# Patient Record
Sex: FEMALE | Race: BLACK OR AFRICAN AMERICAN | Employment: OTHER | URBAN - METROPOLITAN AREA
[De-identification: names, ages, dates, MRNs, and addresses within clinical notes are randomized per-mention and may not be internally consistent; named-entity substitution may affect disease eponyms.]

---

## 2017-05-09 ENCOUNTER — TELEPHONE (OUTPATIENT)
Dept: FAMILY MEDICINE CLINIC | Age: 57
End: 2017-05-09

## 2017-05-09 NOTE — TELEPHONE ENCOUNTER
Called pt, and left a voice message, that her letter has been completed and I will be mailing a copy to her home and will leave a copy at the , if she wants to pick it up.

## 2017-05-09 NOTE — LETTER
5/9/2017 2:02 PM 
 
Ms. Easton Mendosa [address] [address] To whom it may Concern:  
Provider: Baldev Valle Case: Jamari Spear. Navya Valle is a patient of [employer], and Dr. Rangel Palacios is her primary care 
 
provider. I  have managed care for Ms. Mendosa and Ree, for over 5 years, in which time I have 
 
observed the love, compassion, and dedication Ms. Mendosa has for Ree. By insuring she arrives at 
 
appointments on time, following medical advice, and calling the office with any concerns she may have 
 
in regard to Michael gusman, and following through with the recommendations. Michael gusman always comes into the 
 
office smiling, well nourished, and with a great appearance. With all of the above, I do feel it is in the 
 
best interest of Ree to remain under the care of Ms. Mendosa where she is already receiving great care. If there are any questions or concerns please have Ms. Mendosa contact my office at [phone].   
 
 
 
 
Sincerely, 
 
 
Alan Martinez MD

## 2018-01-27 ENCOUNTER — OFFICE VISIT (OUTPATIENT)
Dept: FAMILY MEDICINE CLINIC | Age: 58
End: 2018-01-27

## 2018-01-27 VITALS
DIASTOLIC BLOOD PRESSURE: 84 MMHG | HEART RATE: 65 BPM | WEIGHT: 215 LBS | SYSTOLIC BLOOD PRESSURE: 129 MMHG | OXYGEN SATURATION: 95 % | BODY MASS INDEX: 38.09 KG/M2 | TEMPERATURE: 98.7 F | HEIGHT: 63 IN | RESPIRATION RATE: 18 BRPM

## 2018-01-27 DIAGNOSIS — F17.200 SMOKER: ICD-10-CM

## 2018-01-27 DIAGNOSIS — J02.9 SORE THROAT: Primary | ICD-10-CM

## 2018-01-27 DIAGNOSIS — J06.9 UPPER RESPIRATORY TRACT INFECTION, UNSPECIFIED TYPE: ICD-10-CM

## 2018-01-27 LAB
FLUAV+FLUBV AG NOSE QL IA.RAPID: NEGATIVE POS/NEG
FLUAV+FLUBV AG NOSE QL IA.RAPID: NEGATIVE POS/NEG
S PYO AG THROAT QL: POSITIVE
VALID INTERNAL CONTROL?: YES
VALID INTERNAL CONTROL?: YES

## 2018-01-27 RX ORDER — AZITHROMYCIN 250 MG/1
TABLET, FILM COATED ORAL
Qty: 6 TAB | Refills: 0 | Status: SHIPPED | OUTPATIENT
Start: 2018-01-27 | End: 2018-02-01

## 2018-01-27 NOTE — MR AVS SNAPSHOT
[address] [address] 
[phone] Patient: Jackie Jimenez MRN: [id number] Visit Information Date & Time Provider Department Dept. Phone Encounter #  
 1/27/2018  2:30 PM Estevan Coto MD [facility] [phone] [id number] Upcoming Health Maintenance Date Due Hepatitis C Screening 1960 Pneumococcal 19-64 Medium Risk (1 of 1 - PPSV23) 5/4/1980 DTaP/Tdap/Td series (1 - Tdap) 5/4/1982 FOBT Q 1 YEAR AGE 50-75 3/20/2013 BREAST CANCER SCRN MAMMOGRAM 12/29/2016 PAP AKA CERVICAL CYTOLOGY 3/20/2017 Influenza Age 5 to Adult 8/1/2017 Allergies as of 1/27/2018  Review Complete On: 1/27/2018 By: Estevan Coto MD  
  
 Severity Noted Reaction Type Reactions Bactrim [Sulfamethoprim Ds]  03/20/2012    Hives Current Immunizations  Never Reviewed No immunizations on file. Not reviewed this visit You Were Diagnosed With   
  
 Codes Comments Sore throat    -  Primary ICD-10-CM: J02.9 ICD-9-CM: 334 Upper respiratory tract infection, unspecified type     ICD-10-CM: J06.9 ICD-9-CM: 465.9 BMI 38.0-38.9,adult     ICD-10-CM: K45.21 
ICD-9-CM: V85.38 Smoker     ICD-10-CM: U58.848 ICD-9-CM: 305.1 letter Vitals BP Pulse Temp Resp Height(growth percentile) Weight(growth percentile) 129/84 (BP 1 Location: Left arm, BP Patient Position: Sitting) 65 98.7 °F (37.1 °C) (Oral) 18 5' 3\" (1.6 m) 215 lb (97.5 kg) SpO2 BMI OB Status Smoking Status 95% 38.09 kg/m2 Hysterectomy Current Every Day Smoker Vitals History BMI and BSA Data Body Mass Index Body Surface Area 38.09 kg/m 2 2.08 m 2 Preferred Pharmacy Pharmacy Name Phone [address] [phone] Your Updated Medication List  
  
   
 This list is accurate as of: 1/27/18  2:50 PM.  Always use your most recent med list.  
  
  
  
  
 atenolol-chlorthalidone 100-25 mg per tablet Commonly known as:  TENORETIC  
TAKE 1 TABLET BY MOUTH DAILY  
  
 azithromycin 250 mg tablet Commonly known as:  Gresham Bon Take 2 tablets today, then take 1 tablet daily  
  
 cimetidine 800 mg tablet Commonly known as:  TAGAMET TAKE 1 TABLET BY MOUTH TWICE DAILY OR AS NEEDED Prescriptions Sent to Pharmacy Refills  
 azithromycin (ZITHROMAX) 250 mg tablet 0 Sig: Take 2 tablets today, then take 1 tablet daily Class: Normal  
 Pharmacy: [place] [address] [facility] [facility] Ph #: [phone] We Performed the Following AMB POC RAPID STREP A [02036 CPT(R)] AMB POC CARISSA INFLUENZA A/B TEST [36986 CPT(R)] Patient Instructions Keep up with your female wellness exams Avoid tobacco products Gargle with warm salt water Take:   over the counter tylenol as directed for pain or fever Use OTC Mucinex for cough Consider over the counter nasacort steroid nasal spray Consider trying the \"NetiPot\" which is a salt water nasal flush if you have nasal congestion:  It really helps! Make sure you use distilled water to make the saline solution Take zithromax with food If worse go to [facility]! Reba Fuchs introduces Fwd: Power patient portal. Now you can access parts of your medical record, email your doctor's office, and request medication refills online. 1. In your internet browser, go to [email or web address] 2. Click on the First Time User? Click Here link in the Sign In box. You will see the New Member Sign Up page. 3. Enter your Fwd: Power Access Code exactly as it appears below. You will not need to use this code after youve completed the sign-up process.  If you do not sign up before the expiration date, you must request a new code. · [employer] Access Code: [email or web address] Expires: 4/27/2018  2:50 PM 
 
4. Enter the last four digits of your Social Security Number (xxxx) and Date of Birth (mm/dd/yyyy) as indicated and click Submit. You will be taken to the next sign-up page. 5. Create a [employer] ID. This will be your [employer] login ID and cannot be changed, so think of one that is secure and easy to remember. 6. Create a [employer] password. You can change your password at any time. 7. Enter your Password Reset Question and Answer. This can be used at a later time if you forget your password. 8. Enter your e-mail address. You will receive e-mail notification when new information is available in [address] 9. Click Sign Up. You can now view and download portions of your medical record. 10. Click the Download Summary menu link to download a portable copy of your medical information. If you have questions, please visit the Frequently Asked Questions section of the [employer] website. Remember, [employer] is NOT to be used for urgent needs. For medical emergencies, dial 911. Now available from your iPhone and Android! Please provide this summary of care documentation to your next provider. Your primary care clinician is listed as Gabriela Cox. If you have any questions after today's visit, please call [phone].

## 2018-01-27 NOTE — PROGRESS NOTES
Yen Dailey is a 65 y.o. 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    Essential hypertension with goal blood pressure less than 140/90 I10    GERD (gastroesophageal reflux disease) K21.9    LVH (left ventricular hypertrophy) I51.7    Grave's disease     Tobacco abuse Z72.0    Gallstones K80.20    Fatty liver K76.0    Fatty hernia of linea alba K43.9    Hypercholesterolemia E78.00    Hypokalemia E87.6       Medications:  Current Outpatient Prescriptions   Medication Sig Dispense Refill    azithromycin (ZITHROMAX) 250 mg tablet Take 2 tablets today, then take 1 tablet daily 6 Tab 0    atenolol-chlorthalidone (TENORETIC) 100-25 mg per tablet TAKE 1 TABLET BY MOUTH DAILY 90 Tab 3    cimetidine (TAGAMET) 800 mg tablet TAKE 1 TABLET BY MOUTH TWICE DAILY OR AS NEEDED 60 Tab 11       Allergies: Allergies   Allergen Reactions    Bactrim [Sulfamethoprim Ds] Hives       LMP:  No LMP recorded. Patient has had a hysterectomy. Social History     Social History    Marital status:      Spouse name: N/A    Number of children: N/A    Years of education: N/A     Occupational History    Not on file.      Social History Main Topics    Smoking status: Current Every Day Smoker     Packs/day: 0.25     Years: 20.00    Smokeless tobacco: Never Used    Alcohol use Yes      Comment: Rarely    Drug use: No    Sexual activity: Yes     Partners: Male     Other Topics Concern    Not on file     Social History Narrative         Family History   Problem Relation Age of Onset    Hypertension Mother     Cancer Father     Cancer Maternal Grandfather          Meaningful use:  done      ROS:  Headaches:  no  Chest Pain:  no  SOB:  no  Fevers:  no  Other significant ROS:  She needs wellness exam    No LMP recorded. Patient has had a hysterectomy. Physical Exam  Visit Vitals    /84 (BP 1 Location: Left arm, BP Patient Position: Sitting)    Pulse 65    Temp 98.7 °F (37.1 °C) (Oral)    Resp 18    Ht 5' 3\" (1.6 m)    Wt 215 lb (97.5 kg)    SpO2 95%    BMI 38.09 kg/m2     BP Readings from Last 3 Encounters:   01/27/18 129/84   09/07/16 107/71   08/25/16 111/68     Constitutional:  Appears well,  No Acute Distress, Vitals noted  Psychiatric:   Affect normal, Alert and cooperative, Oriented to person/place/time    Eyes:   Pupils equally round and reactive, EOMI, conjunctiva clear, eyelids normal  ENT:   External ears and nose normal/lips, teeth=OK/gums normal, TMs and Orophyarynx normal  Neck:   general inspection and Thyroid normal.  No abnormal cervical or supraclavicular nodes    Lungs:   clear to auscultation, good respiratory effort  Heart: Ausculation normal.  Regular rhythm. No cardiac murmurs. 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 Sore throat J02.9 462 AMB POC RAPID STREP A      AMB POC CARISSA INFLUENZA A/B TEST      azithromycin (ZITHROMAX) 250 mg tablet   2.  Upper respiratory tract infection, unspecified type J06.9 465.9 azithromycin (ZITHROMAX) 250 mg tablet   3. BMI 38.0-38.9,adult Z68.38 V85.38    4. Smoker F17.200 305.1     letter       Plan:  Orders Placed This Encounter    AMB POC RAPID STREP A    AMB POC CARISSA INFLUENZA A/B TEST    azithromycin (ZITHROMAX) 250 mg tablet     Sig: Take 2 tablets today, then take 1 tablet daily     Dispense:  6 Tab     Refill:  0       See patient instructions  Patient Instructions   Keep up with your female wellness exams    Avoid tobacco products    Gargle with warm salt water    Take:   over the counter tylenol as directed for pain or fever    Use OTC Mucinex for cough    Consider over the counter nasacort steroid nasal spray    Consider trying the \"NetiPot\" which is a salt water nasal flush if you have nasal congestion:  It really helps!   Make sure you use distilled water to make the saline solution      Take zithromax with food    If worse go to ER        refresh note:  done    AVS Printed:  done

## 2018-01-27 NOTE — PROGRESS NOTES
1. Have you been to the ER, urgent care clinic since your last visit? Hospitalized since your last visit? No    2. Have you seen or consulted any other health care providers outside of the [facility] since your last visit? Include any pap smears or colon screening.  No  Reviewed record in preparation for visit and have necessary documentation  Pt did not bring medication to office visit for review  opportunity was given for questions  Goals that were addressed and/or need to be completed during or after this appointment include   Health Maintenance Due   Topic Date Due    Hepatitis C Screening  1960    Pneumococcal 19-64 Medium Risk (1 of 1 - PPSV23) 05/04/1980    DTaP/Tdap/Td series (1 - Tdap) 05/04/1982    FOBT Q 1 YEAR AGE 50-75  03/20/2013    BREAST CANCER SCRN MAMMOGRAM  12/29/2016    PAP AKA CERVICAL CYTOLOGY  03/20/2017    Influenza Age 9 to Adult  08/01/2017

## 2018-01-27 NOTE — PATIENT INSTRUCTIONS
Keep up with your female wellness exams    Avoid tobacco products    Gargle with warm salt water    Take:   over the counter tylenol as directed for pain or fever    Use OTC Mucinex for cough    Consider over the counter nasacort steroid nasal spray    Consider trying the \"NetiPot\" which is a salt water nasal flush if you have nasal congestion:  It really helps!   Make sure you use distilled water to make the saline solution      Take zithromax with food    If worse go to ER

## 2018-01-27 NOTE — LETTER
1/27/2018 2:48 PM 
 
Ms. Arcelia [facility] [address] [address] Body mass index is 38.09 kg/(m^2). Focus on regular exercise (150 minutes each week) and healthy eating. Eat more fruits and vegetables. Eat more protein (egg whites, beans, and nuts you know you tolerate) and less carbohydrates (white bread, white rice, white pasta, white potatoes, sodas, and sweets). Eat appropriately small portion sizes. I strongly suggest that you avoid use of any tobacco products. This may be the most important thing you can do for your health. While medicines may help, the most important thing for you is the decision to quit. If you need a \"Quit \", please call 9-307-QUIT NOW ([phone]). If you need help with nicotine withdrawal, I suggest over-the-counter nicotine replacement aids such as nicotine gum or patches, used as directed. As you may know, use of tobacco products increases your risk for many forms of cancer, heart disease, stroke, and blood clotting. Your body is very forgiving. Quit now and improve your health! Sincerely, Tom Garcia MD

## 2018-02-03 RX ORDER — ATENOLOL AND CHLORTHALIDONE TABLET 100; 25 MG/1; MG/1
TABLET ORAL
Qty: 90 TAB | Refills: 3 | Status: SHIPPED | OUTPATIENT
Start: 2018-02-03 | End: 2019-03-16 | Stop reason: SDUPTHER

## 2018-03-18 RX ORDER — CIMETIDINE 800 MG
TABLET ORAL
Qty: 60 TAB | Refills: 11 | Status: SHIPPED | OUTPATIENT
Start: 2018-03-18 | End: 2019-09-19 | Stop reason: SDUPTHER

## 2018-06-19 ENCOUNTER — HOSPITAL ENCOUNTER (OUTPATIENT)
Dept: GENERAL RADIOLOGY | Age: 58
Discharge: HOME | End: 2018-06-19
Payer: COMMERCIAL

## 2018-06-19 ENCOUNTER — OFFICE VISIT (OUTPATIENT)
Dept: FAMILY MEDICINE CLINIC | Age: 58
End: 2018-06-19

## 2018-06-19 VITALS
RESPIRATION RATE: 18 BRPM | DIASTOLIC BLOOD PRESSURE: 71 MMHG | TEMPERATURE: 98.3 F | SYSTOLIC BLOOD PRESSURE: 133 MMHG | WEIGHT: 214 LBS | HEIGHT: 63 IN | HEART RATE: 64 BPM | BODY MASS INDEX: 37.92 KG/M2

## 2018-06-19 DIAGNOSIS — M25.561 ACUTE PAIN OF RIGHT KNEE: ICD-10-CM

## 2018-06-19 DIAGNOSIS — Z12.39 BREAST CANCER SCREENING: ICD-10-CM

## 2018-06-19 DIAGNOSIS — Z12.11 ENCOUNTER FOR FIT (FECAL IMMUNOCHEMICAL TEST) SCREENING: ICD-10-CM

## 2018-06-19 DIAGNOSIS — M25.561 ACUTE PAIN OF RIGHT KNEE: Primary | ICD-10-CM

## 2018-06-19 PROCEDURE — 73562 X-RAY EXAM OF KNEE 3: CPT

## 2018-06-19 RX ORDER — IBUPROFEN 800 MG/1
800 TABLET ORAL
Qty: 30 TAB | Refills: 1 | Status: SHIPPED | OUTPATIENT
Start: 2018-06-19 | End: 2019-07-06 | Stop reason: ALTCHOICE

## 2018-06-19 NOTE — PROGRESS NOTES
Chief Complaint   Patient presents with    Knee Swelling     rt; painful; hot to touch x 3 days     1. Have you been to the ER, urgent care clinic since your last visit? No  Hospitalized since your last visit? No     2. Have you seen or consulted any other health care providers outside of the [facility] since your last visit? Include any pap smears or colon screening. No     Pt declines due vaccinations.

## 2018-06-19 NOTE — PATIENT INSTRUCTIONS

## 2018-06-19 NOTE — MR AVS SNAPSHOT
[address] [phone] Patient: Wally Nesbitt MRN: [id number] Visit Information Date & Time Provider Department Dept. Phone Encounter #  
 6/19/2018  3:45 PM Earnestine Vaz 34 [id number] Upcoming Health Maintenance Date Due Hepatitis C Screening 1960 Pneumococcal 19-64 Medium Risk (1 of 1 - PPSV23) 5/4/1980 DTaP/Tdap/Td series (1 - Tdap) 5/4/1982 FOBT Q 1 YEAR AGE 50-75 3/20/2013 BREAST CANCER SCRN MAMMOGRAM 12/29/2016 PAP AKA CERVICAL CYTOLOGY 3/20/2017 Influenza Age 5 to Adult 8/1/2018 Allergies as of 6/19/2018  Review Complete On: 6/19/2018 By: Martha Donis MD  
  
 Severity Noted Reaction Type Reactions Bactrim [Sulfamethoprim Ds]  03/20/2012    Hives Current Immunizations  Never Reviewed No immunizations on file. Not reviewed this visit You Were Diagnosed With   
  
 Codes Comments Acute pain of right knee    -  Primary ICD-10-CM: M25.561 ICD-9-CM: 719.46 Encounter for FIT (fecal immunochemical test) screening     ICD-10-CM: Z12.11 ICD-9-CM: V76.51 Breast cancer screening     ICD-10-CM: Z12.31 
ICD-9-CM: V76.10 Vitals BP Pulse Temp Resp Height(growth percentile) Weight(growth percentile) 133/71 64 98.3 °F (36.8 °C) (Oral) 18 5' 3\" (1.6 m) 214 lb (97.1 kg) BMI OB Status Smoking Status 37.91 kg/m2 Hysterectomy Current Every Day Smoker Vitals History BMI and BSA Data Body Mass Index Body Surface Area  
 37.91 kg/m 2 2.08 m 2 Preferred Pharmacy Pharmacy Name Phone [address] [phone] Your Updated Medication List  
  
   
This list is accurate as of 6/19/18  4:36 PM.  Always use your most recent med list.  
  
  
  
 atenolol-chlorthalidone 100-25 mg per tablet Commonly known as:  TENORETIC  
TAKE 1 TABLET BY MOUTH DAILY  
  
 cimetidine 800 mg tablet Commonly known as:  TAGAMET TAKE 1 TABLET BY MOUTH TWICE DAILY AS NEEDED We Performed the Following OCCULT BLOOD, IMMUNOASSAY (FIT) [id number] CPT(R)] To-Do List   
 06/19/2018 Imaging:  HUNTER MAMMO BI SCREENING INCL CAD   
  
 06/19/2018 Imaging:  XR KNEE RT MIN 4 V Introducing [facility]! [employer] introduces [employer] patient portal. Now you can access parts of your medical record, email your doctor's office, and request medication refills online. 1. In your internet browser, go to [email or web address]. [email or web address] 2. Click on the First Time User? Click Here link in the Sign In box. You will see the New Member Sign Up page. 3. Enter your [employer] Access Code exactly as it appears below. You will not need to use this code after youve completed the sign-up process. If you do not sign up before the expiration date, you must request a new code. · [employer] Access Code: [email or web address] Expires: 9/17/2018  4:36 PM 
 
4. Enter the last four digits of your Social Security Number (xxxx) and Date of Birth (mm/dd/yyyy) as indicated and click Submit. You will be taken to the next sign-up page. 5. Create a [employer] ID. This will be your [employer] login ID and cannot be changed, so think of one that is secure and easy to remember. 6. Create a [employer] password. You can change your password at any time. 7. Enter your Password Reset Question and Answer. This can be used at a later time if you forget your password. 8. Enter your e-mail address. You will receive e-mail notification when new information is available in [address] 9. Click Sign Up. You can now view and download portions of your medical record. 10. Click the Download Summary menu link to download a portable copy of your medical information. If you have questions, please visit the Frequently Asked Questions section of the [employer] website. Remember, [email or web address] is NOT to be used for urgent needs. For medical emergencies, dial 911. Now available from your iPhone and Android! Please provide this summary of care documentation to your next provider. Your primary care clinician is listed as Gonzalez Sheikh. If you have any questions after today's visit, please call [phone].

## 2018-06-19 NOTE — PROGRESS NOTES
HISTORY OF PRESENT ILLNESS  Marlene Rowe is a 63 y.o. female. HPI Comments: Marlene Rowe presents with right knee pain, redness, swelling and warmth for the past 3 days. It started after sleeping on a sofa and it felt \"off\" and has gotten worse. No locking or giving way. Denies history of trauma. She has tried 400 mg of Motrin and ice, which helps temporarily. It is worse at the end of the day. She had some sort of procedure done on that knee when she was a child. Review of Systems   Musculoskeletal: Positive for joint pain. Right knee swelling   Skin:        Right knee redness and warmth       Visit Vitals    /71    Pulse 64    Temp 98.3 °F (36.8 °C) (Oral)    Resp 18    Ht 5' 3\" (1.6 m)    Wt 214 lb (97.1 kg)    BMI 37.91 kg/m2     Physical Exam   Constitutional: She is oriented to person, place, and time. She appears well-developed and well-nourished. No distress. Musculoskeletal:        Right knee: She exhibits decreased range of motion, swelling, effusion, erythema and bony tenderness. She exhibits no deformity, no LCL laxity and no MCL laxity. No tenderness found. Legs:  Unable to evaluate meniscal signs due to pain. Neurological: She is alert and oriented to person, place, and time. Skin: She is not diaphoretic. ASSESSMENT and PLAN    ICD-10-CM ICD-9-CM    1. Acute pain of right knee M25.561 719.46 ibuprofen (MOTRIN) 800 mg tablet      CANCELED: XR KNEE RT MIN 4 V   2. Encounter for FIT (fecal immunochemical test) screening Z12.11 V76.51 OCCULT BLOOD, IMMUNOASSAY (FIT)   3. Breast cancer screening Z12.31 V76.10 HUNTER MAMMO BI SCREENING INCL CAD        Likely knee osteoarthritis  Rest, elevate, ice  Motrin dose increased for prn use  Knee x-ray  Weight loss  Regular low impact aerobics      Follow-up Disposition:  Return if symptoms worsen or fail to improve. Reviewed plan of care. Patient has provided input and agrees with goals.

## 2018-06-20 ENCOUNTER — TELEPHONE (OUTPATIENT)
Dept: FAMILY MEDICINE CLINIC | Age: 58
End: 2018-06-20

## 2018-06-27 ENCOUNTER — HOSPITAL ENCOUNTER (OUTPATIENT)
Dept: MAMMOGRAPHY | Age: 58
Discharge: HOME | End: 2018-06-27
Attending: FAMILY MEDICINE
Payer: COMMERCIAL

## 2018-06-27 DIAGNOSIS — Z12.39 BREAST CANCER SCREENING: ICD-10-CM

## 2018-06-27 PROCEDURE — 77067 SCR MAMMO BI INCL CAD: CPT

## 2018-10-15 ENCOUNTER — OFFICE VISIT (OUTPATIENT)
Dept: FAMILY MEDICINE CLINIC | Age: 58
End: 2018-10-15

## 2018-10-15 VITALS
WEIGHT: 216 LBS | RESPIRATION RATE: 18 BRPM | HEART RATE: 60 BPM | TEMPERATURE: 98.4 F | SYSTOLIC BLOOD PRESSURE: 137 MMHG | BODY MASS INDEX: 38.27 KG/M2 | HEIGHT: 63 IN | DIASTOLIC BLOOD PRESSURE: 81 MMHG

## 2018-10-15 DIAGNOSIS — J06.9 VIRAL UPPER RESPIRATORY TRACT INFECTION: Primary | ICD-10-CM

## 2018-10-15 DIAGNOSIS — Z12.11 ENCOUNTER FOR FIT (FECAL IMMUNOCHEMICAL TEST) SCREENING: ICD-10-CM

## 2018-10-15 RX ORDER — LORATADINE 10 MG/1
10 TABLET ORAL DAILY
COMMUNITY
End: 2019-07-06

## 2018-10-15 NOTE — PROGRESS NOTES
HISTORY OF PRESENT ILLNESS  Robert Benitez is a 63 y.o. female. HPI Comments: Robert Benitez is here with URI symptoms for the past 2 weeks. In particular, she is having left ear pain, a dry throat and a lot of drainage. She was exposed to strep 2 weeks ago, so she is concerned. Her symptoms are a little better. She has tried Mucinex Day and Night with a little relief. Things are worse in the afternoon. No history of allergies. Review of Systems   Constitutional: Negative for chills, fever and malaise/fatigue. HENT: Positive for congestion and ear pain. Negative for sore throat. Mucous is clear in color. There is no sinus pain. Eyes: Negative for discharge and redness. Respiratory: Positive for sputum production. Negative for cough, shortness of breath and wheezing. Scant yellow sputum   Cardiovascular: Negative for chest pain. Neurological: Negative for headaches. Visit Vitals    /81    Pulse 60    Temp 98.4 °F (36.9 °C) (Oral)    Resp 18    Ht 5' 3\" (1.6 m)    Wt 216 lb (98 kg)    BMI 38.26 kg/m2     Physical Exam   Constitutional: She is oriented to person, place, and time. She appears well-developed and well-nourished. No distress. HENT:   Head: Normocephalic. Right Ear: Tympanic membrane, external ear and ear canal normal.   Left Ear: Tympanic membrane, external ear and ear canal normal.   Nose: Nasal discharge present. Right sinus exhibits no maxillary sinus tenderness and no frontal sinus tenderness. Left sinus exhibits no maxillary sinus tenderness and no frontal sinus tenderness. Mouth/Throat: Uvula is midline, oropharynx is clear and moist and mucous membranes are normal.   Eyes: Right eye exhibits no discharge. Left eye exhibits no discharge. Right conjunctiva is not injected. Left conjunctiva is not injected. Cardiovascular: Normal rate, regular rhythm and normal heart sounds. Exam reveals no gallop and no friction rub.     No murmur heard.  Pulmonary/Chest: Effort normal and breath sounds normal. No respiratory distress. She has no wheezes. She has no rales. Lymphadenopathy:     She has no cervical adenopathy. Neurological: She is alert and oriented to person, place, and time. Skin: Skin is warm and dry. She is not diaphoretic. Nursing note and vitals reviewed. ASSESSMENT and PLAN    ICD-10-CM ICD-9-CM    1. Viral upper respiratory tract infection J06.9 465.9 loratadine (CLARITIN) 10 mg tablet   2. Encounter for FIT (fecal immunochemical test) screening Z12.11 V76.51 OCCULT BLOOD IMMUNOASSAY,DIAGNOSTIC        URI  Rest, push fluids  Add Claritin prn  Reassured her she does not have strep  Kit for FOBT testing given to patient      Follow-up Disposition:  Return if not better in 1-2 weeks. Reviewed plan of care. Patient has provided input and agrees with goals.

## 2018-10-15 NOTE — MR AVS SNAPSHOT
[address] 
[phone] Patient: Ankita Deleon MRN: [id number] DYI:8[birth year] Visit Information Date & Time Provider Department Dept. Phone Encounter #  
 10/15/2018  2:30 PM Earnestine Love 34 [id number] Upcoming Health Maintenance Date Due Hepatitis C Screening 1960 Pneumococcal 19-64 Medium Risk (1 of 1 - PPSV23) 5/4/1980 DTaP/Tdap/Td series (1 - Tdap) 5/4/1982 Shingrix Vaccine Age 50> (1 of 2) 5/4/2011 FOBT Q 1 YEAR AGE 50-75 3/20/2013 PAP AKA CERVICAL CYTOLOGY 3/20/2017 Influenza Age 5 to Adult 8/1/2018 BREAST CANCER SCRN MAMMOGRAM 6/27/2020 Allergies as of 10/15/2018  Review Complete On: 10/15/2018 By: Belle Farley MD  
  
 Severity Noted Reaction Type Reactions Bactrim [Sulfamethoprim Ds]  03/20/2012    Hives Current Immunizations  Never Reviewed No immunizations on file. Not reviewed this visit You Were Diagnosed With   
  
 Codes Comments Viral upper respiratory tract infection    -  Primary ICD-10-CM: J06.9 ICD-9-CM: 465.9 Encounter for FIT (fecal immunochemical test) screening     ICD-10-CM: Z12.11 ICD-9-CM: V76.51 Vitals BP Pulse Temp Resp Height(growth percentile) Weight(growth percentile) 137/81 60 98.4 °F (36.9 °C) (Oral) 18 5' 3\" (1.6 m) 216 lb (98 kg) BMI OB Status Smoking Status 38.26 kg/m2 Hysterectomy Current Every Day Smoker Vitals History BMI and BSA Data Body Mass Index Body Surface Area  
 38.26 kg/m 2 2.09 m 2 Preferred Pharmacy Pharmacy Name Phone [address] [phone] Your Updated Medication List  
  
   
This list is accurate as of 10/15/18  3:15 PM.  Always use your most recent med list.  
  
  
  
  
 atenolol-chlorthalidone 100-25 mg per tablet Commonly known as:  TENORETIC  
TAKE 1 TABLET BY MOUTH DAILY  
  
 cimetidine 800 mg Tab Commonly known as:  TAGAMET TAKE 1 TABLET BY MOUTH TWICE DAILY AS NEEDED CLARITIN 10 mg tablet Generic drug:  loratadine Take 1 Tab by mouth daily. ibuprofen 800 mg tablet Commonly known as:  MOTRIN Take 1 Tab by mouth every eight (8) hours as needed for Pain. We Performed the Following OCCULT BLOOD IMMUNOASSAY,DIAGNOSTIC [42820 CPT(R)] Introducing [facility] SERVICES! Jose Eduardo Andi introduces Phonetime patient portal. Now you can access parts of your medical record, email your doctor's office, and request medication refills online. 1. In your internet browser, go to [email or web address] 2. Click on the First Time User? Click Here link in the Sign In box. You will see the New Member Sign Up page. 3. Enter your Phonetime Access Code exactly as it appears below. You will not need to use this code after youve completed the sign-up process. If you do not sign up before the expiration date, you must request a new code. · Phonetime Access Code: [email or web address] Expires: 1/13/2019  3:15 PM 
 
4. Enter the last four digits of your Social Security Number (xxxx) and Date of Birth (mm/dd/yyyy) as indicated and click Submit. You will be taken to the next sign-up page. 5. Create a Phonetime ID. This will be your Phonetime login ID and cannot be changed, so think of one that is secure and easy to remember. 6. Create a Phonetime password. You can change your password at any time. 7. Enter your Password Reset Question and Answer. This can be used at a later time if you forget your password. 8. Enter your e-mail address. You will receive e-mail notification when new information is available in [address] 9. Click Sign Up. You can now view and download portions of your medical record. 10. Click the Download Summary menu link to download a portable copy of your medical information. If you have questions, please visit the Frequently Asked Questions section of the [employer] Realm website. Remember, [employer]m is NOT to be used for urgent needs. For medical emergencies, dial 911. Now available from your iPhone and Android! Please provide this summary of care documentation to your next provider. Your primary care clinician is listed as Brendan Boyd. If you have any questions after today's visit, please call [phone].

## 2018-10-15 NOTE — PROGRESS NOTES
Chief Complaint   Patient presents with    Ear Pain     left and left jaw; exposed to strep by family member     Nasal Discharge     1. Have you been to the ER, urgent care clinic since your last visit? No Hospitalized since your last visit? No     2. Have you seen or consulted any other health care providers outside of the [address] [place] [address] since your last visit? Include any pap smears or colon screening. No     Pt declines vaccines.

## 2019-03-17 RX ORDER — ATENOLOL AND CHLORTHALIDONE TABLET 100; 25 MG/1; MG/1
TABLET ORAL
Qty: 90 TAB | Refills: 1 | Status: SHIPPED | OUTPATIENT
Start: 2019-03-17 | End: 2019-09-19 | Stop reason: SDUPTHER

## 2019-07-06 ENCOUNTER — OFFICE VISIT (OUTPATIENT)
Dept: FAMILY MEDICINE CLINIC | Age: 59
End: 2019-07-06

## 2019-07-06 VITALS
TEMPERATURE: 98.9 F | BODY MASS INDEX: 38.45 KG/M2 | HEIGHT: 63 IN | RESPIRATION RATE: 16 BRPM | DIASTOLIC BLOOD PRESSURE: 74 MMHG | OXYGEN SATURATION: 96 % | HEART RATE: 66 BPM | WEIGHT: 217 LBS | SYSTOLIC BLOOD PRESSURE: 123 MMHG

## 2019-07-06 DIAGNOSIS — M77.8 DELTOID TENDINITIS OF LEFT SHOULDER: Primary | ICD-10-CM

## 2019-07-06 RX ORDER — DICLOFENAC SODIUM 10 MG/G
2 GEL TOPICAL 2 TIMES DAILY
Qty: 30 G | Refills: 0 | Status: SHIPPED | OUTPATIENT
Start: 2019-07-06 | End: 2021-07-28

## 2019-07-06 RX ORDER — DICLOFENAC SODIUM 50 MG/1
50 TABLET, DELAYED RELEASE ORAL
Qty: 14 TAB | Refills: 0 | Status: SHIPPED | OUTPATIENT
Start: 2019-07-06 | End: 2019-07-13

## 2019-07-06 RX ORDER — CYCLOBENZAPRINE HCL 10 MG
5 TABLET ORAL
Qty: 15 TAB | Refills: 0 | Status: SHIPPED | OUTPATIENT
Start: 2019-07-06 | End: 2021-07-28

## 2019-07-06 NOTE — PROGRESS NOTES
HISTORY OF PRESENT ILLNESS  Yolanda Mendosa is a 63 y.o. female. HPI  Presents for left arm pain x2 days. Started suddenly as she was sitting in a car and reaching with her right arm to pull the seatbelt. Felt pain on her left deltoid, progressively got worse and now experienced with using arm or shoulder movement or dressing herself, throbbing pain, radiates down arm but not to forearm or hand. No neck or back pain, no numbness or tingling. No night time awakening. She denies any falls or injuries or sudden movement with her left arm. Daughter has special needs and usually helps her. She has tried ibuprofen 800 mg without significant relief. Review of Systems   Constitutional: Negative for chills and fever. Respiratory: Negative for shortness of breath. Cardiovascular: Negative for chest pain, palpitations and leg swelling. Musculoskeletal: Negative for back pain and neck pain. Neurological: Negative for tingling, sensory change and focal weakness.      Past Medical History:   Diagnosis Date    Fatty hernia of linea alba 2012    Fatty liver 4/3/2012    Gallstones 3/29/2012    GERD (gastroesophageal reflux disease)     Grave's disease     Hypercholesterolemia 2012    Hypertension     Hypokalemia 2013    LE (discoid lupus erythematosus)     LVH (left ventricular hypertrophy)     on echo    Nausea & vomiting     Severe, requests Scopolamine Patch    Tobacco abuse 3/20/2012     Past Surgical History:   Procedure Laterality Date    HX  SECTION      x2     HX HYSTERECTOMY  2008    HX KNEE ARTHROSCOPY      right knee     Social History     Socioeconomic History    Marital status:      Spouse name: Not on file    Number of children: Not on file    Years of education: Not on file    Highest education level: Not on file   Tobacco Use    Smoking status: Current Every Day Smoker     Packs/day: 0.25     Years: 20.00     Pack years: 5.00    Smokeless tobacco: Never Used   Substance and Sexual Activity    Alcohol use: Yes     Comment: Rarely    Drug use: No    Sexual activity: Yes     Partners: Male     Family History   Problem Relation Age of Onset    Hypertension Mother     Cancer Father     Cancer Maternal Grandfather      Current Outpatient Medications on File Prior to Visit   Medication Sig Dispense Refill    atenolol-chlorthalidone (TENORETIC) 100-25 mg per tablet TAKE 1 TABLET BY MOUTH DAILY 90 Tab 1    cimetidine (TAGAMET) 800 mg tablet TAKE 1 TABLET BY MOUTH TWICE DAILY AS NEEDED 60 Tab 11     No current facility-administered medications on file prior to visit. Allergies   Allergen Reactions    Bactrim [Sulfamethoprim Ds] Hives       Physical Exam  Visit Vitals  /74 (BP 1 Location: Left arm, BP Patient Position: Sitting)   Pulse 66   Temp 98.9 °F (37.2 °C)   Resp 16   Ht 5' 3\" (1.6 m)   Wt 217 lb (98.4 kg)   SpO2 96%   BMI 38.44 kg/m²   General: well appearing, NAD  Resp: CTAB  CV: RRR, no m/r/g  Neck: supple, full ROM, no cervical muscle or spine tenderness, negative Spurling's  MSK: normal active & passive movement of left shoulder, pain with resisted shoulder flexion, mild tenderness to palpation over left deltoid muscle, normal elbow & wrist, normal right arm  Neuro: normal stregnth & sensation    ASSESSMENT and PLAN    ICD-10-CM ICD-9-CM    1. Deltoid tendinitis of left shoulder M75.82 726.10 diclofenac EC (VOLTAREN) 50 mg EC tablet      diclofenac (VOLTAREN) 1 % gel      cyclobenzaprine (FLEXERIL) 10 mg tablet   likely tendinitis of left deltoid muscle, ibuprofen ineffective, switch to voltaren oral + gel prn, tylenol prn, flexeril if not improving, discussed shoulder stretches, avoid heavy lifting, return if symptoms worsen or do not improve in a week. Follow-up and Dispositions    · Return if symptoms worsen or fail to improve.        Monae Martinez MD

## 2019-07-06 NOTE — PROGRESS NOTES
Chief Complaint   Patient presents with    Arm Pain     left arm     1. Have you been to the ER, urgent care clinic since your last visit? Hospitalized since your last visit? No    2. Have you seen or consulted any other health care providers outside of the [address] [place] [address] since your last visit? Include any pap smears or colon screening.  No

## 2019-09-19 NOTE — TELEPHONE ENCOUNTER
[facility] but needs refill for 30 day supply of her medications to last until her appointment, noting she's not been able to come in sooner due to caring for her mother and her daughter.   Yue

## 2019-09-20 RX ORDER — ATENOLOL AND CHLORTHALIDONE TABLET 100; 25 MG/1; MG/1
TABLET ORAL
Qty: 90 TAB | Refills: 0 | Status: SHIPPED | OUTPATIENT
Start: 2019-09-20 | End: 2019-10-17 | Stop reason: SDUPTHER

## 2019-09-20 RX ORDER — CIMETIDINE 800 MG
TABLET ORAL
Qty: 180 TAB | Refills: 0 | Status: SHIPPED | OUTPATIENT
Start: 2019-09-20 | End: 2019-09-24 | Stop reason: RX

## 2019-09-24 ENCOUNTER — TELEPHONE (OUTPATIENT)
Dept: FAMILY MEDICINE CLINIC | Age: 59
End: 2019-09-24

## 2019-09-24 RX ORDER — FAMOTIDINE 40 MG/1
40 TABLET, FILM COATED ORAL
Qty: 90 TAB | Refills: 0 | Status: SHIPPED | OUTPATIENT
Start: 2019-09-24 | End: 2021-07-28

## 2019-09-24 NOTE — TELEPHONE ENCOUNTER
Received fax from [facility] that Cimetidine 800mg is currently unavailable and requesting a substitute.

## 2019-10-17 ENCOUNTER — OFFICE VISIT (OUTPATIENT)
Dept: FAMILY MEDICINE CLINIC | Age: 59
End: 2019-10-17

## 2019-10-17 VITALS
RESPIRATION RATE: 18 BRPM | BODY MASS INDEX: 38.27 KG/M2 | DIASTOLIC BLOOD PRESSURE: 84 MMHG | OXYGEN SATURATION: 100 % | TEMPERATURE: 98.4 F | HEART RATE: 65 BPM | SYSTOLIC BLOOD PRESSURE: 136 MMHG | HEIGHT: 63 IN | WEIGHT: 216 LBS

## 2019-10-17 DIAGNOSIS — Z11.59 ENCOUNTER FOR HEPATITIS C SCREENING TEST FOR LOW RISK PATIENT: ICD-10-CM

## 2019-10-17 DIAGNOSIS — Z12.11 SCREENING FOR COLON CANCER: ICD-10-CM

## 2019-10-17 DIAGNOSIS — Z72.0 TOBACCO ABUSE: ICD-10-CM

## 2019-10-17 DIAGNOSIS — E66.01 SEVERE OBESITY (HCC): ICD-10-CM

## 2019-10-17 DIAGNOSIS — I10 ESSENTIAL HYPERTENSION: Primary | ICD-10-CM

## 2019-10-17 DIAGNOSIS — K76.0 FATTY LIVER: ICD-10-CM

## 2019-10-17 DIAGNOSIS — F17.200 SMOKER: ICD-10-CM

## 2019-10-17 DIAGNOSIS — E78.00 HYPERCHOLESTEROLEMIA: ICD-10-CM

## 2019-10-17 RX ORDER — ATENOLOL AND CHLORTHALIDONE TABLET 100; 25 MG/1; MG/1
TABLET ORAL
Qty: 90 TAB | Refills: 2 | Status: SHIPPED | OUTPATIENT
Start: 2019-10-17 | End: 2020-07-13

## 2019-10-17 NOTE — PROGRESS NOTES
Family Medicine Follow-Up Progress Note  Patient: Abhishek Tapia  1960, 63 y.o., female  Encounter Date: 10/17/2019    ASSESSMENT & PLAN    ICD-10-CM ICD-9-CM    1. Essential hypertension I10 401.9 CBC W/O DIFF      METABOLIC PANEL, COMPREHENSIVE   2. Smoker F17.200 305.1    3. Fatty liver K76.0 571.8 REFERRAL TO GASTROENTEROLOGY      METABOLIC PANEL, COMPREHENSIVE   4. Tobacco abuse Z72.0 305.1    5. Hypercholesterolemia E78.00 272.0 LIPID PANEL   6. Screening for colon cancer Z12.11 V76.51 REFERRAL TO GASTROENTEROLOGY   7. Encounter for hepatitis C screening test for low risk patient Z11.59 V73.89 HEPATITIS C AB       Orders Placed This Encounter    CBC W/O DIFF    LIPID PANEL    METABOLIC PANEL, COMPREHENSIVE    HEPATITIS C AB    [facility]     Referral Priority:   Routine     Referral Type:   Consultation     Referral Reason:   Specialty Services Required     Referral Location:   [facility]     Referred to Provider:   Doris Yang MD     Number of Visits Requested:   1    atenolol-chlorthalidone (TENORETIC) 100-25 mg per tablet     Sig: TAKE 1 TABLET BY MOUTH DAILY     Dispense:  90 Tab     Refill:  2       Patient Instructions   Blood pressure is adequately controlled, med refilled however the patient does need to go for labs as ordered including CBC and CMP  She is a smoker and I encouraged her to stop smoking  She has a history of fatty liver disease and so we will get a metabolic panel that is comprehensive to ensure we have liver function test  She has a history of hyperlipidemia and we will screen with a fasting lipid panel  She is due for colon cancer screening and hepatitis C screening as ordered  Encouraged her to follow a healthy diet and exercise regularly with a goal of moderate weight loss as discussed today  Follow-up in 4 months or sooner on an as-needed basis      CHIEF COMPLAINT  Chief Complaint   Patient presents with    Hypertension     Follow up. SUBJECTIVE  Shanon Mendosa is a 63 y.o. female presenting today for blood pressure follow-up. She reports it is well controlled and she is tolerating her medicine however she is due for refill. She has no headaches or vision changes, no leg swelling, no falling down or passing out. She felt that her ears were full recently, she is not sure what the cause of this was  She would like to establish with an OB/GYN and she is wanting a recommendation  She has not had lab work drawn in years and she is wondering if this is necessary  Her  recommended that she have a colonoscopy and she knows that she is due for one  She is otherwise feeling well today and has no other complaints  Denies all vaccinations    Review of Systems  A 12 point review of systems was negative except as noted here or in the HPI. OBJECTIVE  Visit Vitals  /84 (BP 1 Location: Left arm, BP Patient Position: Sitting)   Pulse 65   Temp 98.4 °F (36.9 °C) (Oral)   Resp 18   Ht 5' 3\" (1.6 m)   Wt 216 lb (98 kg)   SpO2 100%   BMI 38.26 kg/m²       Physical Exam   Constitutional: She is oriented to person, place, and time. She appears well-developed and well-nourished. No distress. NAD, Nontoxic, Appears Stated Age, overweight   HENT:   Head: Normocephalic and atraumatic. Mouth/Throat: Oropharynx is clear and moist.   Eyes: Conjunctivae and EOM are normal. Right eye exhibits no discharge. Left eye exhibits no discharge. No scleral icterus. Neck: Neck supple. No thyromegaly present. Cardiovascular: Normal rate, regular rhythm and normal heart sounds. No murmur heard. Pulmonary/Chest: Effort normal and breath sounds normal. No stridor. No respiratory distress. She has no wheezes. She has no rales. Abdominal: Soft. Bowel sounds are normal. She exhibits no distension. There is no tenderness. Musculoskeletal: She exhibits no edema or tenderness. Neurological: She is alert and oriented to person, place, and time.    Grossly intact CN   Skin: Skin is warm and dry. No rash noted. She is not diaphoretic. Psychiatric: She has a normal mood and affect. Her behavior is normal.   Nursing note and vitals reviewed. No results found for any visits on 10/17/19. HISTORICAL  Reviewed and updated today, and as noted below:    Past Medical History:   Diagnosis Date    Fatty hernia of linea alba 2012    Fatty liver 4/3/2012    Gallstones 3/29/2012    GERD (gastroesophageal reflux disease)     Grave's disease     Hypercholesterolemia 2012    Hypertension     Hypokalemia 2013    LE (discoid lupus erythematosus)     LVH (left ventricular hypertrophy)     on echo    Nausea & vomiting     Severe, requests Scopolamine Patch    Tobacco abuse 3/20/2012     Past Surgical History:   Procedure Laterality Date    HX  SECTION      x2     HX HYSTERECTOMY      HX KNEE ARTHROSCOPY      right knee     Family History   Problem Relation Age of Onset    Hypertension Mother     Cancer Father     Cancer Maternal Grandfather      Social History     Tobacco Use   Smoking Status Current Every Day Smoker    Packs/day: 0.25    Years: 20.00    Pack years: 5.00   Smokeless Tobacco Never Used     Social History     Socioeconomic History    Marital status:      Spouse name: Not on file    Number of children: Not on file    Years of education: Not on file    Highest education level: Not on file   Tobacco Use    Smoking status: Current Every Day Smoker     Packs/day: 0.25     Years: 20.00     Pack years: 5.00    Smokeless tobacco: Never Used   Substance and Sexual Activity    Alcohol use: Yes     Comment: Rarely    Drug use: No    Sexual activity: Yes     Partners: Male     Allergies   Allergen Reactions    Bactrim [Sulfamethoprim Ds] Hives       No visits with results within 3 Month(s) from this visit.    Latest known visit with results is:   Office Visit on 2018   Component Date Value Ref Range Status  VALID INTERNAL CONTROL POC 01/27/2018 Yes   Final    Group A Strep Ag 01/27/2018 Positive  Negative Final    VALID INTERNAL CONTROL POC 01/27/2018 Yes   Final    Influenza A Ag POC 01/27/2018 Negative  Negative Pos/Neg Final    Influenza B Ag POC 01/27/2018 Negative  Negative Pos/Neg Final         Ingrid Fountain MD  [facility]  10/17/19 2:06 PM    Portions of this note may have been populated using smart dictation software and may have \"sounds-like\" errors present. Pt was counseled on risks, benefits and alternatives of treatment options. All questions were asked and answered and the patient was agreeable with the treatment plan as outlined.

## 2019-10-17 NOTE — PROGRESS NOTES
Chief Complaint   Patient presents with    Hypertension     Follow up. 1. Have you been to the ER, urgent care clinic since your last visit? Hospitalized since your last visit? No    2. Have you seen or consulted any other health care providers outside of the [address] [place] [address] since your last visit? Include any pap smears or colon screening. No      Patient declined flu vaccine.

## 2019-10-17 NOTE — PATIENT INSTRUCTIONS
Blood pressure is adequately controlled, med refilled however the patient does need to go for labs as ordered including CBC and CMP She is a smoker and I encouraged her to stop smoking She has a history of fatty liver disease and so we will get a metabolic panel that is comprehensive to ensure we have liver function test 
She has a history of hyperlipidemia and we will screen with a fasting lipid panel She is due for colon cancer screening and hepatitis C screening as ordered Encouraged her to follow a healthy diet and exercise regularly with a goal of moderate weight loss as discussed today Follow-up in 4 months or sooner on an as-needed basis

## 2019-11-20 LAB
ALBUMIN SERPL-MCNC: 4.3 G/DL (ref 3.5–5.5)
ALBUMIN/GLOB SERPL: 1.7 {RATIO} (ref 1.2–2.2)
ALP SERPL-CCNC: 70 IU/L (ref 39–117)
ALT SERPL-CCNC: 14 IU/L (ref 0–32)
AST SERPL-CCNC: 16 IU/L (ref 0–40)
BILIRUB SERPL-MCNC: 0.3 MG/DL (ref 0–1.2)
BUN SERPL-MCNC: 11 MG/DL (ref 6–24)
BUN/CREAT SERPL: 15 (ref 9–23)
CALCIUM SERPL-MCNC: 9.4 MG/DL (ref 8.7–10.2)
CHLORIDE SERPL-SCNC: 102 MMOL/L (ref 96–106)
CHOLEST SERPL-MCNC: 220 MG/DL (ref 100–199)
CO2 SERPL-SCNC: 24 MMOL/L (ref 20–29)
CREAT SERPL-MCNC: 0.75 MG/DL (ref 0.57–1)
ERYTHROCYTE [DISTWIDTH] IN BLOOD BY AUTOMATED COUNT: 12.5 % (ref 12.3–15.4)
GLOBULIN SER CALC-MCNC: 2.5 G/DL (ref 1.5–4.5)
GLUCOSE SERPL-MCNC: 90 MG/DL (ref 65–99)
HCT VFR BLD AUTO: 38.4 % (ref 34–46.6)
HCV AB S/CO SERPL IA: <0.1 S/CO RATIO (ref 0–0.9)
HDLC SERPL-MCNC: 46 MG/DL
HGB BLD-MCNC: 13.4 G/DL (ref 11.1–15.9)
INTERPRETATION, 910389: NORMAL
LDLC SERPL CALC-MCNC: 155 MG/DL (ref 0–99)
MCH RBC QN AUTO: 29.6 PG (ref 26.6–33)
MCHC RBC AUTO-ENTMCNC: 34.9 G/DL (ref 31.5–35.7)
MCV RBC AUTO: 85 FL (ref 79–97)
PLATELET # BLD AUTO: 218 X10E3/UL (ref 150–450)
POTASSIUM SERPL-SCNC: 3.8 MMOL/L (ref 3.5–5.2)
PROT SERPL-MCNC: 6.8 G/DL (ref 6–8.5)
RBC # BLD AUTO: 4.52 X10E6/UL (ref 3.77–5.28)
SODIUM SERPL-SCNC: 138 MMOL/L (ref 134–144)
TRIGL SERPL-MCNC: 93 MG/DL (ref 0–149)
VLDLC SERPL CALC-MCNC: 19 MG/DL (ref 5–40)
WBC # BLD AUTO: 5.3 X10E3/UL (ref 3.4–10.8)

## 2019-11-22 NOTE — PROGRESS NOTES
Hepatitis C screening negative  Blood count normal  Electrolytes kidney and liver functions are normal  Cholesterol is actually about stable from 7 years ago, LDL and total cholesterol are elevated, we can work on these through diet and exercise initially and may want to consider medication in the future

## 2019-12-11 ENCOUNTER — OFFICE VISIT (OUTPATIENT)
Dept: OBGYN CLINIC | Age: 59
End: 2019-12-11

## 2019-12-11 VITALS — SYSTOLIC BLOOD PRESSURE: 137 MMHG | BODY MASS INDEX: 38.97 KG/M2 | DIASTOLIC BLOOD PRESSURE: 78 MMHG | WEIGHT: 220 LBS

## 2019-12-11 DIAGNOSIS — Z01.419 ENCOUNTER FOR WELL WOMAN EXAM WITH ROUTINE GYNECOLOGICAL EXAM: Primary | ICD-10-CM

## 2019-12-11 NOTE — PROGRESS NOTES
Annual exam ages 40-58 post hysterectomy      Ashutosh Mcknight is a No obstetric history on file. ,  63 y.o. female   No LMP recorded. Patient has had a hysterectomy. hx of RHEA    She presents for her annual checkup. She is having no significant problems. With regard to the Gardasil vaccine, she is older than the FDA approved age to receive it. Hormonal status:  She reports no perimenstrual type symptoms. She is not having vasomotor symptoms. The patient is not using any ERT. Sexual history:    She  reports being sexually active and has had partner(s) who are Male. Medical conditions:    Since her last annual GYN exam about three or more years ago, she has not the following changes in her health history: none. Surgical history confirmed with patient. has a past surgical history that includes hx  section; hx hysterectomy (); and hx knee arthroscopy. Pap and Mammogram History:    Her most recent Pap smear was normal with -HPV, obtained 7 year(s) ago 3/2012. .    The patient had her mammogram today in our office. Breast Cancer History/Substance Abuse: negative    Osteoporosis History:    Family history does not include a first or second degree relative with osteopenia or osteoporosis. A bone density scan has never been done.   Past Medical History:   Diagnosis Date    Fatty hernia of linea alba 2012    Fatty liver 4/3/2012    Gallstones 3/29/2012    GERD (gastroesophageal reflux disease)     Grave's disease     Hypercholesterolemia 2012    Hypertension     Hypokalemia 2013    LE (discoid lupus erythematosus)     LVH (left ventricular hypertrophy)     on echo    Nausea & vomiting     Severe, requests Scopolamine Patch    Tobacco abuse 3/20/2012     Past Surgical History:   Procedure Laterality Date    HX  SECTION      x2     HX HYSTERECTOMY      HX KNEE ARTHROSCOPY      right knee       Current Outpatient Medications   Medication Sig Dispense Refill    atenolol-chlorthalidone (TENORETIC) 100-25 mg per tablet TAKE 1 TABLET BY MOUTH DAILY 90 Tab 2    famotidine (PEPCID) 40 mg tablet Take 1 Tab by mouth two (2) times daily as needed (GERD). 90 Tab 0    diclofenac (VOLTAREN) 1 % gel Apply 2 g to affected area two (2) times a day. TO LEFT SHOULDER TWICE A DAY AS NEEDED, KEEP AREA UNCOVERED FOR AT LEAST 10 MIN 30 g 0    cyclobenzaprine (FLEXERIL) 10 mg tablet Take 0.5 Tabs by mouth three (3) times daily as needed for Muscle Spasm(s). 15 Tab 0     Allergies: Bactrim [sulfamethoprim ds]     Tobacco History:  reports that she has been smoking. She has a 5.00 pack-year smoking history. She has never used smokeless tobacco.  Alcohol Abuse:  reports current alcohol use. Drug Abuse:  reports no history of drug use.     Family Medical/Cancer History:   Family History   Problem Relation Age of Onset    Hypertension Mother     Cancer Father     Cancer Maternal Grandfather         Review of Systems - History obtained from the patient  Constitutional: negative for weight loss, fever, night sweats  HEENT: negative for hearing loss, earache, congestion, snoring, sorethroat  CV: negative for chest pain, palpitations, edema  Resp: negative for cough, shortness of breath, wheezing  GI: negative for change in bowel habits, abdominal pain, black or bloody stools  : negative for frequency, dysuria, hematuria, vaginal discharge  MSK: negative for back pain, joint pain, muscle pain  Breast: negative for breast lumps, nipple discharge, galactorrhea  Skin :negative for itching, rash, hives  Neuro: negative for dizziness, headache, confusion, weakness  Psych: negative for anxiety, depression, change in mood  Heme/lymph: negative for bleeding, bruising, pallor    Physical Exam    Visit Vitals  /78   Wt 220 lb (99.8 kg)   BMI 38.97 kg/m²     Constitutional  · Appearance: well-nourished, well developed, alert, in no acute distress    HENT  · Head and Face: appears normal    Neck  · Inspection/Palpation: normal appearance, no masses or tenderness  · Lymph Nodes: no lymphadenopathy present  · Thyroid: gland size normal, nontender, no nodules or masses present on palpation    Chest  · Respiratory Effort: breathing unlabored  · Auscultation: normal breath sounds    Cardiovascular  · Heart:  · Auscultation: regular rate and rhythm without murmur    Breasts  · Inspection of Breasts: breasts symmetrical, no skin changes, no discharge present, nipple appearance normal, no skin retraction present  · Palpation of Breasts and Axillae: no masses present on palpation, no breast tenderness  · Axillary Lymph Nodes: no lymphadenopathy present    Gastrointestinal  · Abdominal Examination: abdomen non-tender to palpation, normal bowel sounds, no masses present  · Liver and spleen: no hepatomegaly present, spleen not palpable  · Hernias: no hernias identified    Genitourinary  · External Genitalia: normal appearance for age, no discharge present, no tenderness present, no inflammatory lesions present, no masses present, no atrophy present  · Vagina: normal vaginal vault without central or paravaginal defects, no discharge present, no inflammatory lesions present, no masses present  · Bladder: non-tender to palpation  · Urethra: appears normal  · Cervix: absent  · Uterus: absent  · Adnexa: no adnexal tenderness present, no adnexal masses present  · Perineum: perineum within normal limits, no evidence of trauma, no rashes or skin lesions present  · Anus: anus within normal limits, no hemorrhoids present  · Inguinal Lymph Nodes: no lymphadenopathy present    Skin  · General Inspection: no rash, no lesions identified    Neurologic/Psychiatric  · Mental Status:  · Orientation: grossly oriented to person, place and time  · Mood and Affect: mood normal, affect appropriate    Assessment:  Routine gynecologic examination  Her current medical status is satisfactory with no evidence of significant gynecologic issues.     Plan:  Counseled re: diet, exercise, healthy lifestyle  Return for yearly wellness visits  Rec annual mammogram  pap

## 2019-12-14 LAB
CYTOLOGIST CVX/VAG CYTO: NORMAL
CYTOLOGY CVX/VAG DOC CYTO: NORMAL
CYTOLOGY CVX/VAG DOC THIN PREP: NORMAL
DX ICD CODE: NORMAL
LABCORP, 190119: NORMAL
Lab: NORMAL
OTHER STN SPEC: NORMAL
STAT OF ADQ CVX/VAG CYTO-IMP: NORMAL

## 2020-02-20 ENCOUNTER — OFFICE VISIT (OUTPATIENT)
Dept: FAMILY MEDICINE CLINIC | Age: 60
End: 2020-02-20

## 2020-02-20 VITALS
HEART RATE: 75 BPM | WEIGHT: 218 LBS | SYSTOLIC BLOOD PRESSURE: 147 MMHG | DIASTOLIC BLOOD PRESSURE: 88 MMHG | BODY MASS INDEX: 38.62 KG/M2 | TEMPERATURE: 98.6 F | HEIGHT: 63 IN | RESPIRATION RATE: 18 BRPM

## 2020-02-20 DIAGNOSIS — J06.9 VIRAL URI: Primary | ICD-10-CM

## 2020-02-20 DIAGNOSIS — Z72.0 TOBACCO ABUSE: ICD-10-CM

## 2020-02-20 RX ORDER — CIMETIDINE 800 MG
400 TABLET ORAL 2 TIMES DAILY
COMMUNITY

## 2020-02-20 RX ORDER — BENZONATATE 200 MG/1
200 CAPSULE ORAL
Qty: 30 CAP | Refills: 0 | Status: SHIPPED | OUTPATIENT
Start: 2020-02-20 | End: 2021-07-28

## 2020-02-20 NOTE — PROGRESS NOTES
Family Medicine Acute Visit Progress Note  Patient: Albino Closs  1960, 63 y.o., female  Encounter Date: 2/20/2020    ASSESSMENT & PLAN    ICD-10-CM ICD-9-CM    1. Viral URI J06.9 465.9    2. Tobacco abuse Z72.0 305.1        Orders Placed This Encounter    cimetidine (TAGAMET) 800 mg tab     Sig: Take 400 mg by mouth two (2) times a day.  benzonatate (TESSALON) 200 mg capsule     Sig: Take 1 Cap by mouth three (3) times daily as needed for Cough. Dispense:  30 Cap     Refill:  0       Patient Instructions   Upper respiratory infections can be both bacterial and viral but in this case we suspect viral. Antibiotics are only indicated when bacterial infections are either strongly suspected or confirmed. Supportive care is appropriate in both cases. Patients with URIs benefit from humidified air, increased fluid consumption, over the counter pain and fever reducers (Ibuprofen, acetaminophen). If not contraindicated, may also use over the counter cough/cold medications, however patients with high blood pressure or risk factors for stroke should not use decongestant medications such as phenylephrine or pseudoephedrine. Nasal saline rinses are appropriate for use, and in general the use of Fluticasone nasal spray (2 sprays in each nostril once daily) can help with congestion--this is available over the counter. For sore throat over the counter cough drops and sore throat drops (for example Cepacol or Chloraseptic) can be used as well as salt water gargles and hot or cold beverages for comfort as needed. Over the counter cough medications can be used, as can honey for cough suppression. If symptoms are not improved by 10-14 days or are improving then acutely worsen, patient is recommended to return to the office for re-evaluation of symptoms.     Encouraged on quitting smoking    No wheezing or signs of copd/asthma exac, no necessity for abx or steroids today      CHIEF COMPLAINT  Chief Complaint   Patient presents with    Cough     dry cough x 2 days     Nasal Discharge     starting today     GERD       SUBJECTIVE  Gio Garay is a 63 y.o. female presenting today for acute cough, runny nose and congestion. She started 3 days ago  Taking flonase, feels like it has \"settled \" in her chest  She smokes a pack in 3 days of cigarettes  She does report that she is wheezing at night time  No fevers    Review of Systems  A 12 point review of systems was negative except as noted here or in the HPI. OBJECTIVE  Visit Vitals  /88   Pulse 75   Temp 98.6 °F (37 °C) (Oral)   Resp 18   Ht 5' 3\" (1.6 m)   Wt 218 lb (98.9 kg)   BMI 38.62 kg/m²       Physical Exam  Vitals signs and nursing note reviewed. Constitutional:       General: She is not in acute distress. Appearance: Normal appearance. She is well-developed. She is not diaphoretic. Comments: Appears stated age   HENT:      Head: Normocephalic and atraumatic. Right Ear: External ear normal. There is no impacted cerumen. Left Ear: External ear normal. There is no impacted cerumen. Ears:      Comments: Mild bilateral serous effusion without signs of otitis     Nose: Congestion present. Comments: Boggy nares, edematous nasal passages     Mouth/Throat:      Mouth: Mucous membranes are moist.      Pharynx: Oropharynx is clear. Posterior oropharyngeal erythema (without exudates or concretions) present. No oropharyngeal exudate. Eyes:      General: No scleral icterus. Right eye: No discharge. Left eye: No discharge. Extraocular Movements: Extraocular movements intact. Conjunctiva/sclera: Conjunctivae normal.      Pupils: Pupils are equal, round, and reactive to light. Neck:      Musculoskeletal: Normal range of motion and neck supple. Vascular: No carotid bruit. Cardiovascular:      Rate and Rhythm: Normal rate and regular rhythm. Heart sounds: Normal heart sounds. No murmur. No friction rub.  No gallop. Pulmonary:      Effort: Pulmonary effort is normal. No respiratory distress. Breath sounds: Normal breath sounds. No stridor. No wheezing, rhonchi or rales. Abdominal:      General: Bowel sounds are normal. There is no distension. Palpations: Abdomen is soft. Tenderness: There is no abdominal tenderness. Musculoskeletal: Normal range of motion. General: No swelling or tenderness. Right lower leg: No edema. Left lower leg: No edema. Lymphadenopathy:      Cervical: Cervical adenopathy present. Skin:     General: Skin is warm and dry. Capillary Refill: Capillary refill takes less than 2 seconds. Coloration: Skin is not pale. Findings: No erythema or rash. Neurological:      General: No focal deficit present. Mental Status: She is alert and oriented to person, place, and time. Mental status is at baseline. Coordination: Coordination normal.      Gait: Gait normal.   Psychiatric:         Mood and Affect: Mood normal.         Behavior: Behavior normal.         Thought Content: Thought content normal.         Judgment: Judgment normal.         No results found for any visits on 02/20/20. HISTORICAL  PMH, PSH, FHX, SOCHX, ALLERGIES and MES were reviewed and updated today. Huzma Smith MD  [facility]  02/20/20 1:20 PM    Portions of this note may have been populated using smart dictation software and may have \"sounds-like\" errors present. Pt was counseled on risks, benefits and alternatives of treatment options. All questions were asked and answered and the patient was agreeable with the treatment plan as outlined.

## 2020-02-20 NOTE — PATIENT INSTRUCTIONS
Upper respiratory infections can be both bacterial and viral but in this case we suspect viral. Antibiotics are only indicated when bacterial infections are either strongly suspected or confirmed. Supportive care is appropriate in both cases. Patients with URIs benefit from humidified air, increased fluid consumption, over the counter pain and fever reducers (Ibuprofen, acetaminophen). If not contraindicated, may also use over the counter cough/cold medications, however patients with high blood pressure or risk factors for stroke should not use decongestant medications such as phenylephrine or pseudoephedrine. Nasal saline rinses are appropriate for use, and in general the use of Fluticasone nasal spray (2 sprays in each nostril once daily) can help with congestion--this is available over the counter. For sore throat over the counter cough drops and sore throat drops (for example Cepacol or Chloraseptic) can be used as well as salt water gargles and hot or cold beverages for comfort as needed. Over the counter cough medications can be used, as can honey for cough suppression. If symptoms are not improved by 10-14 days or are improving then acutely worsen, patient is recommended to return to the office for re-evaluation of symptoms.     Encouraged on quitting smoking    No wheezing or signs of copd/asthma exac, no necessity for abx or steroids today

## 2020-07-13 RX ORDER — ATENOLOL AND CHLORTHALIDONE TABLET 100; 25 MG/1; MG/1
TABLET ORAL
Qty: 90 TAB | Refills: 2 | Status: SHIPPED | OUTPATIENT
Start: 2020-07-13 | End: 2020-10-16

## 2020-07-15 ENCOUNTER — VIRTUAL VISIT (OUTPATIENT)
Dept: FAMILY MEDICINE CLINIC | Age: 60
End: 2020-07-15

## 2020-07-15 DIAGNOSIS — E66.01 SEVERE OBESITY (HCC): ICD-10-CM

## 2020-07-15 DIAGNOSIS — I10 ESSENTIAL HYPERTENSION WITH GOAL BLOOD PRESSURE LESS THAN 140/90: Primary | ICD-10-CM

## 2020-07-15 DIAGNOSIS — E78.00 HYPERCHOLESTEROLEMIA: ICD-10-CM

## 2020-07-15 DIAGNOSIS — K21.9 GASTROESOPHAGEAL REFLUX DISEASE, ESOPHAGITIS PRESENCE NOT SPECIFIED: ICD-10-CM

## 2020-07-15 NOTE — PROGRESS NOTES
Mariano Stewart is a 62 y.o. female who was seen by synchronous (real-time) audio-video technology on 7/15/2020. Consent: Mariano Stewart, who was seen by synchronous (real-time) audio-video technology, and/or her healthcare decision maker, is aware that this patient-initiated, Telehealth encounter on 7/15/2020 is a billable service, with coverage as determined by her insurance carrier. She is aware that she may receive a bill and has provided verbal consent to proceed: Yes. Assessment & Plan:   1. Essential hypertension with goal blood pressure less than 140/90  Not at goal, she is maxed on atenolol and chlorthalidone, she would need to add another agent and today she is resistant to that. We agree to 1 month of lifestyle interventions. She will watch her diet, work on weight loss, increase water and of course take her medications. We will rehceck in 1 month and if persistently above goal, we will need to reassess treatment plan. 2. Gastroesophageal reflux disease, esophagitis presence not specified  Stable and well controlled per patient report    3. Severe obesity ([address]  Patient reports her weight is about stable over the past few months    4. Hypercholesterolemia  Last LDL was 155, she with uncontrolled bp would likely benefit based on hear cholesterol from medciation if interventions are not sufficient, will reassess with her next labs as discussed          Pt was counseled on risks, benefits and alternatives of treatment options. All questions were asked and answered and the patient was agreeable with the treatment plan as outlined. Encounter time today was 25 minutes and more than 50% of this encounter was spent in counseling face-to-face regarding Diagnosis, Patient Education, Medication Management, Compliance and Impressions. Time documented includes face to face time, documentation and chart review if applicable except as noted.   Subjective:   Mariano Stewart is a 62 y.o. female who was seen for Hypertension (Follow up)      HTN: patient reports stable on medications. No chest pain, sob, headache, blurred vision, leg swelling, diaphoresis, falls. Compliant with medications as prescribed. is checking blood pressures at home and reports range is 130-150/80. No significant hyper or hypotensive episodes that the patient reports today. The patient wants to know if she is becoming insensitive to a medicine   She reports that she needs to drink more water and also wants to exercise   GERD: stable and well controlled  WEIGHT: stable  HLD: not on medications    Medications, allergies, PMH, PSH, SOCH, [facility] reviewed and updated per routine protocol, see chart for review and changes if not noted here. ROS  A 12 point review of systems was negative except as noted here or in the HPI. Objective:   Vital Signs: (As obtained by patient/caregiver at home)  There were no vitals taken for this visit.      [INSTRUCTIONS:  \"[x]\" Indicates a positive item  \"[]\" Indicates a negative item  -- DELETE ALL ITEMS NOT EXAMINED]    Constitutional: [x] Appears well-developed and well-nourished [x] No apparent distress      [] Abnormal -     Mental status: [x] Alert and awake  [x] Oriented to person/place/time [x] Able to follow commands    [] Abnormal -     Eyes:   EOM    [x]  Normal    [] Abnormal -   Sclera  [x]  Normal    [] Abnormal -          Discharge [x]  None visible   [] Abnormal -     HENT: [x] Normocephalic, atraumatic  [] Abnormal -   [x] Mouth/Throat: Mucous membranes are moist    External Ears [x] Normal  [] Abnormal -    Neck: [x] No visualized mass [] Abnormal -     Pulmonary/Chest: [x] Respiratory effort normal   [x] No visualized signs of difficulty breathing or respiratory distress        [] Abnormal -      Musculoskeletal:   [x] Normal gait with no signs of ataxia         [x] Normal range of motion of neck        [] Abnormal -     Neurological:        [x] No Facial Asymmetry (Cranial nerve 7 motor function) (limited exam due to video visit)          [x] No gaze palsy        [] Abnormal -          Skin:        [x] No significant exanthematous lesions or discoloration noted on facial skin         [] Abnormal -            Psychiatric:       [x] Normal Affect [] Abnormal -        [x] No Hallucinations    Other pertinent observable physical exam findings: none apparent    We discussed the expected course, resolution and complications of the diagnosis(es) in detail. Medication risks, benefits, costs, interactions, and alternatives were discussed as indicated. I advised her to contact the office if her condition worsens, changes or fails to improve as anticipated. She expressed understanding with the diagnosis(es) and plan. Inez Chavez is a 62 y.o. female who was evaluated by a video visit encounter for concerns as above. Patient identification was verified prior to start of the visit. A caregiver was present when appropriate. Due to this being a TeleHealth encounter (During [facility]-44 public health emergency), evaluation of the following organ systems was limited: Vitals/Constitutional/EENT/Resp/CV/GI//MS/Neuro/Skin/Heme-Lymph-Imm. Pursuant to the emergency declaration under the [facility], [facility] waiver authority and the [employer] and [employer] Act, this Virtual  Visit was conducted, with patient's (and/or legal guardian's) consent, to reduce the patient's risk of exposure to COVID-19 and provide necessary medical care. Services were provided through a video synchronous discussion virtually to substitute for in-person clinic visit. Patient and provider were located at their individual homes. Dominick Mckeon MD  [facility]  07/15/20 1:31 PM     Portions of this note may have been populated using smart dictation software and may have \"sounds-like\" errors present.

## 2020-07-15 NOTE — PROGRESS NOTES
Chief Complaint   Patient presents with    Hypertension     Follow up   1. Have you been to the ER, urgent care clinic since your last visit? Hospitalized since your last visit? No    2. Have you seen or consulted any other health care providers outside of the [address] [place] [address] since your last visit? Include any pap smears or colon screening.  No

## 2020-08-12 ENCOUNTER — TELEPHONE (OUTPATIENT)
Dept: FAMILY MEDICINE CLINIC | Age: 60
End: 2020-08-12

## 2020-08-12 ENCOUNTER — VIRTUAL VISIT (OUTPATIENT)
Dept: FAMILY MEDICINE CLINIC | Age: 60
End: 2020-08-12
Payer: COMMERCIAL

## 2020-08-12 DIAGNOSIS — E78.00 HYPERCHOLESTEROLEMIA: ICD-10-CM

## 2020-08-12 DIAGNOSIS — R61 NIGHT SWEATS: ICD-10-CM

## 2020-08-12 DIAGNOSIS — I10 ESSENTIAL HYPERTENSION: ICD-10-CM

## 2020-08-12 DIAGNOSIS — Z12.11 SCREENING FOR COLON CANCER: ICD-10-CM

## 2020-08-12 DIAGNOSIS — R00.2 PALPITATIONS: Primary | ICD-10-CM

## 2020-08-12 DIAGNOSIS — R00.2 PALPITATIONS: ICD-10-CM

## 2020-08-12 PROCEDURE — 99214 OFFICE O/P EST MOD 30 MIN: CPT | Performed by: FAMILY MEDICINE

## 2020-08-12 NOTE — PROGRESS NOTES
Juliet De Leon is a 62 y.o. female who was seen by synchronous (real-time) audio-video technology on 8/12/2020. Consent: Juliet De Leon, who was seen by synchronous (real-time) audio-video technology, and/or her healthcare decision maker, is aware that this patient-initiated, Telehealth encounter on 8/12/2020 is a billable service, with coverage as determined by her insurance carrier. She is aware that she may receive a bill and has provided verbal consent to proceed: Yes. Assessment & Plan:   1. Palpitations  Pt concerned recurrence of hyperthyroid, check labs, also advised manage anxiety and address after labs if indicated  - CBC W/O DIFF; Future  - METABOLIC PANEL, COMPREHENSIVE; Future  - TSH 3RD GENERATION; Future  - T4, FREE; Future    2. Night sweats  As above  - CBC W/O DIFF; Future  - METABOLIC PANEL, COMPREHENSIVE; Future  - TSH 3RD GENERATION; Future  - T4, FREE; Future    3. Essential hypertension  bp undercontrol now with lifestyle changes, labs and keep up the good work  - CBC W/O DIFF; Future  - METABOLIC PANEL, COMPREHENSIVE; Future    4. Hypercholesterolemia  Due for fasting labs per orders  - METABOLIC PANEL, COMPREHENSIVE; Future  - LIPID PANEL; Future    5. Screening for colon cancer  Screening discussed, defers cscope but agrees for cologuard  - COLOGUARD TEST (FECAL DNA COLORECTAL CANCER SCREENING)          Pt was counseled on risks, benefits and alternatives of treatment options. All questions were asked and answered and the patient was agreeable with the treatment plan as outlined. Encounter time today was 25 minutes and more than 50% of this encounter was spent in counseling face-to-face regarding Diagnosis, Patient Education, Medication Management, Compliance and Impressions. Time documented includes face to face time, documentation and chart review if applicable except as noted.   Subjective:   Juliet De Leon is a 62 y.o. female who was seen for Hypertension (Follow up)      Has a cold, has been drinking water, is loosening up for her but otherwise is doing ok    She has been working on her blood pressure by drinking more water, changing her diet and she's also taking her pressure regularly, runyemin in the 130s/80s    She reports she's got some stress, her mom was in the hosptial recently and she cares for her disabled daughter    The patient noticed a few times during the night she's had some heart fluttering. She thinks it could have been due to being stressed. She reports in the past she had trouble with her thryoid, she was also having some sweating. Look back shows hx of HYPERTHYROID, pt reports at that time she had assoc weight loss not present now    Medications, allergies, PMH, PSH, SOCH, [facility] reviewed and updated per routine protocol, see chart for review and changes if not noted here. ROS  A 12 point review of systems was negative except as noted here or in the HPI.     Objective:   Vital Signs: (As obtained by patient/caregiver at home)  Patient-Reported Vitals 8/12/2020   Patient-Reported Weight 218lb   Patient-Reported Height 5f3i   Patient-Reported Pulse 60   Patient-Reported Systolic  775   Patient-Reported Diastolic 81        [INSTRUCTIONS:  \"[x]\" Indicates a positive item  \"[]\" Indicates a negative item  -- DELETE ALL ITEMS NOT EXAMINED]    Constitutional: [x] Appears well-developed and well-nourished [x] No apparent distress      [] Abnormal -     Mental status: [x] Alert and awake  [x] Oriented to person/place/time [x] Able to follow commands    [] Abnormal -     Eyes:   EOM    [x]  Normal    [] Abnormal -   Sclera  [x]  Normal    [] Abnormal -          Discharge [x]  None visible   [] Abnormal -     HENT: [x] Normocephalic, atraumatic  [] Abnormal -   [x] Mouth/Throat: Mucous membranes are moist    External Ears [x] Normal  [] Abnormal -    Neck: [x] No visualized mass [] Abnormal -     Pulmonary/Chest: [x] Respiratory effort normal   [x] No visualized signs of difficulty breathing or respiratory distress        [] Abnormal -      Musculoskeletal:   [x] Normal gait with no signs of ataxia         [x] Normal range of motion of neck        [] Abnormal -     Neurological:        [x] No Facial Asymmetry (Cranial nerve 7 motor function) (limited exam due to video visit)          [x] No gaze palsy        [] Abnormal -          Skin:        [x] No significant exanthematous lesions or discoloration noted on facial skin         [] Abnormal -            Psychiatric:       [x] Normal Affect [] Abnormal -        [x] No Hallucinations    Other pertinent observable physical exam findings:well appearing    We discussed the expected course, resolution and complications of the diagnosis(es) in detail. Medication risks, benefits, costs, interactions, and alternatives were discussed as indicated. I advised her to contact the office if her condition worsens, changes or fails to improve as anticipated. She expressed understanding with the diagnosis(es) and plan. Miriam Carolina is a 62 y.o. female who was evaluated by a video visit encounter for concerns as above. Patient identification was verified prior to start of the visit. A caregiver was present when appropriate. Due to this being a TeleHealth encounter (During [facility] public health emergency), evaluation of the following organ systems was limited: Vitals/Constitutional/EENT/Resp/CV/GI//MS/Neuro/Skin/Heme-Lymph-Imm. Pursuant to the emergency declaration under the [facility] waiver authority and the [employer] and [employer] Act, this Virtual  Visit was conducted, with patient's (and/or legal guardian's) consent, to reduce the patient's risk of exposure to COVID-19 and provide necessary medical care. Services were provided through a video synchronous discussion virtually to substitute for in-person clinic visit.    Patient and provider were located at their individual homes. Rukhsana Colon MD  [facility]  08/12/20 12:59 PM     Portions of this note may have been populated using smart dictation software and may have \"sounds-like\" errors present.

## 2020-08-12 NOTE — PROGRESS NOTES
Chief Complaint   Patient presents with    Hypertension     Follow up   1. Have you been to the ER, urgent care clinic since your last visit? Hospitalized since your last visit? No    2. Have you seen or consulted any other health care providers outside of the [address] [place] [address] since your last visit? Include any pap smears or colon screening.  No

## 2020-08-12 NOTE — TELEPHONE ENCOUNTER
----- Message from Adela Pisano MD sent at 8/12/2020  1:05 PM EDT -----  Regarding: please fax labs  Please fax lab merlin labs to the [facility] , the patient will go fasting one morning here soon to get htem done

## 2020-08-14 LAB
ALBUMIN SERPL-MCNC: 4.1 G/DL (ref 3.8–4.9)
ALBUMIN/GLOB SERPL: 1.6 {RATIO} (ref 1.2–2.2)
ALP SERPL-CCNC: 64 IU/L (ref 39–117)
ALT SERPL-CCNC: 18 IU/L (ref 0–32)
AST SERPL-CCNC: 20 IU/L (ref 0–40)
BILIRUB SERPL-MCNC: 0.3 MG/DL (ref 0–1.2)
BUN SERPL-MCNC: 15 MG/DL (ref 6–24)
BUN/CREAT SERPL: 18 (ref 9–23)
CALCIUM SERPL-MCNC: 9.8 MG/DL (ref 8.7–10.2)
CHLORIDE SERPL-SCNC: 101 MMOL/L (ref 96–106)
CHOLEST SERPL-MCNC: 240 MG/DL (ref 100–199)
CO2 SERPL-SCNC: 30 MMOL/L (ref 20–29)
CREAT SERPL-MCNC: 0.85 MG/DL (ref 0.57–1)
ERYTHROCYTE [DISTWIDTH] IN BLOOD BY AUTOMATED COUNT: 12.9 % (ref 11.7–15.4)
GLOBULIN SER CALC-MCNC: 2.5 G/DL (ref 1.5–4.5)
GLUCOSE SERPL-MCNC: 92 MG/DL (ref 65–99)
HCT VFR BLD AUTO: 45.7 % (ref 34–46.6)
HDLC SERPL-MCNC: 42 MG/DL
HGB BLD-MCNC: 14.9 G/DL (ref 11.1–15.9)
INTERPRETATION, 910389: NORMAL
LDLC SERPL CALC-MCNC: 169 MG/DL (ref 0–99)
MCH RBC QN AUTO: 29.3 PG (ref 26.6–33)
MCHC RBC AUTO-ENTMCNC: 32.6 G/DL (ref 31.5–35.7)
MCV RBC AUTO: 90 FL (ref 79–97)
PLATELET # BLD AUTO: 194 X10E3/UL (ref 150–450)
POTASSIUM SERPL-SCNC: 3.7 MMOL/L (ref 3.5–5.2)
PROT SERPL-MCNC: 6.6 G/DL (ref 6–8.5)
RBC # BLD AUTO: 5.09 X10E6/UL (ref 3.77–5.28)
SODIUM SERPL-SCNC: 143 MMOL/L (ref 134–144)
T4 FREE SERPL-MCNC: 1.22 NG/DL (ref 0.82–1.77)
TRIGL SERPL-MCNC: 145 MG/DL (ref 0–149)
TSH SERPL DL<=0.005 MIU/L-ACNC: 1.99 UIU/ML (ref 0.45–4.5)
VLDLC SERPL CALC-MCNC: 29 MG/DL (ref 5–40)
WBC # BLD AUTO: 6 X10E3/UL (ref 3.4–10.8)

## 2020-08-14 NOTE — PROGRESS NOTES
Normal blood count  Normal thyroid tests  Normal blood sugar, normal electrolytes, normal kidney and liver functions  Cholesterol slightly worse from last year, increase in total cholesterol and bad cholesterol, stable good cholesterol, increase in triglycerides.  This is where we can see some room for improvement with change in diet/exercise

## 2020-08-20 LAB — COLOGUARD TEST, EXTERNAL: NEGATIVE

## 2020-10-16 RX ORDER — ATENOLOL AND CHLORTHALIDONE TABLET 100; 25 MG/1; MG/1
TABLET ORAL
Qty: 90 TAB | Refills: 2 | Status: SHIPPED | OUTPATIENT
Start: 2020-10-16 | End: 2021-07-19

## 2021-04-21 ENCOUNTER — TELEPHONE (OUTPATIENT)
Dept: FAMILY MEDICINE CLINIC | Age: 61
End: 2021-04-21

## 2021-04-21 NOTE — TELEPHONE ENCOUNTER
She can see me virtually tomorrow on the next day. If an EKG is needed, she can be sent to outpatient registration.

## 2021-04-21 NOTE — TELEPHONE ENCOUNTER
Pt transferred to office per Vicky Tejada from [facility] requesting in office appointment for palpitations, sometimes with pain in left arm. Pt denies chest pain, shortness of breath, nausea, sweating, headache, jaw pain, back pain, but notes increased stress and great difficulty sleeping since her Mom was placed on hospice care. Pt agrees to go to ER if symptoms above, is not having any symptoms now, noting palpitations usually occur at night. Pt scheduled for first available appointment in office with Dr. Foster Severance on 1/27/21 at 11:30 am.     Please advise if pt can be seen sooner, preferably in afternoon when she has coverage for her Mom.  Yue

## 2021-04-22 ENCOUNTER — VIRTUAL VISIT (OUTPATIENT)
Dept: FAMILY MEDICINE CLINIC | Age: 61
End: 2021-04-22
Payer: COMMERCIAL

## 2021-04-22 DIAGNOSIS — Z72.0 TOBACCO ABUSE: ICD-10-CM

## 2021-04-22 DIAGNOSIS — R00.2 PALPITATIONS: Primary | ICD-10-CM

## 2021-04-22 DIAGNOSIS — I10 ESSENTIAL HYPERTENSION WITH GOAL BLOOD PRESSURE LESS THAN 140/90: ICD-10-CM

## 2021-04-22 DIAGNOSIS — M79.622 LEFT UPPER ARM PAIN: ICD-10-CM

## 2021-04-22 DIAGNOSIS — Z82.49 FAMILY HISTORY OF EARLY CAD: ICD-10-CM

## 2021-04-22 PROCEDURE — 99214 OFFICE O/P EST MOD 30 MIN: CPT | Performed by: FAMILY MEDICINE

## 2021-04-22 RX ORDER — AMLODIPINE BESYLATE 5 MG/1
5 TABLET ORAL DAILY
Qty: 30 TAB | Refills: 1 | Status: SHIPPED | OUTPATIENT
Start: 2021-04-22 | End: 2021-06-22

## 2021-04-22 NOTE — LETTER
4/22/2021 2:46 PM 
 
Ms. Laquita Schilder [address] [address] Dear Farzaneh Rojas: 
 
Here is the smoking cessation information I told you about: 
 
Stopping Smoking: After Your Visit Your Care Instructions Cigarette smokers crave the nicotine in cigarettes. Giving it up is much harder than simply changing a habit. Your body has to stop craving the nicotine. It is hard to quit, but you can do it. There are many tools that people use to quit smoking. You may find that combining tools works best for you. There are several steps to quitting. First you get ready to quit. Then you get support to help you. After that, you learn new skills and behaviors to become a nonsmoker. For many people, a necessary step is getting and using medicine. Your doctor will help you set up the plan that best meets your needs. You may want to attend a smoking cessation program to help you quit smoking. When you choose a program, look for one that has proven success. Ask your doctor for ideas. You will greatly increase your chances of success if you take medicine as well as get counseling or join a cessation program. 
Some of the changes you feel when you first quit tobacco are uncomfortable. Your body will miss the nicotine at first, and you may feel short-tempered and grumpy. You may have trouble sleeping or concentrating. Medicine can help you deal with these symptoms. You may struggle with changing your smoking habits and rituals. The last step is the tricky one: Be prepared for the smoking urge to continue for a time. This is a lot to deal with, but keep at it. You will feel better. Follow-up care is a key part of your treatment and safety. Be sure to make and go to all appointments, and call your doctor if you are having problems. Its also a good idea to know your test results and keep a list of the medicines you take. How can you care for yourself at home?  
Ask your family, friends, and coworkers for support. You have a better chance of quitting if you have help and support. Join a support group, such as Nicotine Anonymous, for people who are trying to quit smoking. Consider signing up for a smoking cessation program, such as the American Lung Association's Freedom from Smoking program.  
Set a quit date and stick to it. Pick your date carefully so that it is not right in the middle of a big deadline or stressful time. Once you quit, do not even take a puff. Get rid of all ashtrays and lighters after your last cigarette. Clean your house and your clothes so that they do not smell of smoke. Learn how to be a nonsmoker. Think about ways you can avoid those things that make you reach for a cigarette. Avoid situations that put you at greatest risk for smoking. For some people, it is hard to have a drink with friends without smoking. For others, they might skip a coffee break with coworkers who smoke. Change your daily routine. Take a different route to work or eat a meal in a different place. Cut down on stress. Calm yourself or release tension by doing an activity you enjoy, such as reading a book, taking a hot bath, or gardening. Talk to your doctor or pharmacist about nicotine replacement therapy, which replaces the nicotine in your body. You still get nicotine but you do not use tobacco. Nicotine replacement products help you slowly reduce the amount of nicotine you need. These products come in several forms, many of them available over-the-counter:  
Nicotine patches Nicotine gum and lozenges Nicotine inhaler Ask your doctor about bupropion (Wellbutrin) or varenicline (Chantix), which are prescription medicines. They do not contain nicotine. They help you by reducing withdrawal symptoms, such as stress and anxiety. Some people find hypnosis, acupuncture, and massage helpful for ending the smoking habit. Eat a healthy diet and get regular exercise.  Having healthy habits will help your body move past its craving for nicotine. Be prepared to keep trying. Most people are not successful the first few times they try to quit. Do not get mad at yourself if you smoke again. Make a list of things you learned and think about when you want to try again, such as next week, next month, or next year. If other medicines and techniques do not seem to help you, your doctor may prescribe a different medicine, such as nortriptyline (Aventyl, Pamelor) or clonidine (Catapres), that usually is used to treat other medical conditions. These medicines also can be effective at helping people quit smoking. Where can you learn more? Go to [employer]Tax.ca.be Enter X001 in the search box to learn more about \"Stopping Smoking: After Your Visit. \"  
© 5744-3250 Healthwise, Incorporated. Care instructions adapted under license by [facility] (which disclaims liability or warranty for this information). This care instruction is for use with your licensed healthcare professional. If you have questions about a medical condition or this instruction, always ask your healthcare professional. [address] any warranty or liability for your use of this information. Content Version: 6.8.63454; Last Revised: August 10, 2009 Deciding About Using Medicines To Quit Smoking What are the medicines you can use? Your doctor may prescribe varenicline (Chantix) or bupropion (Zyban) to help you cope with cravings for tobacco. These medicines are pills that don't contain nicotine. You also can use nicotine replacement products, which do contain nicotine. There are several types of nicotine replacement. Gum and lozenges slowly release nicotine into your mouth. Patches stick to your skin and slowly release nicotine into your bloodstream.  
An inhaler has a santos that contains nicotine. It delivers a puff of nicotine vapor into your mouth and throat.  
What are key points about this decision? Using medicines can double your chances of quitting smoking. They can relieve nicotine craving and withdrawal symptoms. Getting counseling, along with using medicine, can increase your chances of quitting even more. If you smoke fewer than 10 cigarettes a day, you may not need medicines to help you quit smoking. The side effects of nicotine replacement products depend on the type of product. For example, a patch can make your skin red and itchy. Medicines in pill form can cause nausea, dry mouth, and trouble sleeping. For most people, the side effects are not bad enough to make them stop using the medicines. Nicotine medicines have less nicotine than cigarettes. And by itself, nicotine is not nearly as harmful as smoking. The tars, carbon monoxide, and other toxic chemicals in tobacco cause the harmful effects. Why might you choose to use medicines to quit smoking? You have tried on your own to stop smoking, but you were not able to stop. You smoke more than 10 cigarettes a day. You want to increase your chances of quitting smoking. You want to reduce your craving for tobacco and your nicotine withdrawal symptoms. You feel the benefits of medicine outweigh the side effects. Why might you choose not to use medicine? You want to try quitting on your own by stopping all at once (\"cold turkey\"). You want to cut back slowly on the number of cigarettes you smoke. You smoke fewer than 10 cigarettes a day. You do not like using medicine. You feel the side effects of medicines outweigh the benefits. You are worried about the cost of medicines. Your decision Thinking about the facts and your feelings can help you make a decision that is right for you. Be sure you understand the benefits and risks of your options, and think about what else you need to do before you make the decision. Where can you learn more? Go to [employer].be Enter T580 in the search box to learn more about \"Deciding About Using Medicines To Quit Smoking. \"  
© 1879-8537 Healthwise, Incorporated. Care instructions adapted under license by [employer] (which disclaims liability or warranty for this information). This care instruction is for use with your licensed healthcare professional. If you have questions about a medical condition or this instruction, always ask your healthcare professional. [place] [address] any warranty or liability for your use of this information. Content Version: 6.3.36717; Last Revised: January 6, 2010 Good luck with your efforts!  
 
 
 
Sincerely, 
 
 
Aj Landrum MD

## 2021-04-22 NOTE — PROGRESS NOTES
Chief Complaint   Patient presents with    Palpitations     off and on x 1 month     Chest Pain     off and on x 1 month - with left arm pain

## 2021-04-22 NOTE — PROGRESS NOTES
Bryan Mckeon is a 62 y.o. female who was seen by synchronous (real-time) audio-video technology on 4/22/2021. Assessment & Plan:   Diagnoses and all orders for this visit:    1. Palpitations  -     EKG, 12 LEAD, INITIAL; Future  -     REFERRAL TO CARDIOLOGY    2. Left upper arm pain  -     EKG, 12 LEAD, INITIAL; Future  -     REFERRAL TO CARDIOLOGY    3. Essential hypertension with goal blood pressure less than 140/90  -     amLODIPine (Norvasc) 5 mg tablet; Take 1 Tab by mouth daily. 4. Tobacco abuse    5. Family history of early CAD        Palpitations associated with left arm pain, risk factors for CAD  Blood pressure elevated  EKG today  Cardiology referral  She will call 911 if further symptoms  Add Norvasc      Follow-up and Dispositions    · Return in about 6 weeks (around 6/3/2021) for blood pressure. Reviewed plan of care. Patient has provided input and agrees with goals. CPT Codes 18415-00323 for Established Patients may apply to this Telehealth Visit      Subjective:   Bryan Mckeon was seen for Palpitations (off and on x 1 month ) and Chest Pain (off and on x 1 month - with left arm pain )      Patient presents with:  Palpitations: off and on x 1 month   Chest Pain: off and on x 1 month - with left arm pain     Actually, she is having fluttering in her chest, but no pain. The fluttering is intermittent, daily and getting worse. She is under a lot of stress and she drinks 30 ounces Pepsi. The fluttering starts out with the arm pain. The episodes last about 10 minutes and are not worse with exertion. She has HTN, a family history of CAD and she smokes. Review of Systems   Constitutional: Negative for diaphoresis. Respiratory: Negative for shortness of breath. Cardiovascular: Positive for palpitations. Negative for chest pain. Gastrointestinal: Negative for nausea and vomiting. Neurological: Negative for dizziness.          Objective:   /93, P 68    Physical Exam    Due to this being a TeleHealth evaluation, many elements of the physical examination are unable to be assessed. We discussed the expected course, resolution and complications of the diagnosis(es) in detail. Medication risks, benefits, costs, interactions, and alternatives were discussed as indicated. I advised her to contact the office if her condition worsens, changes or fails to improve as anticipated. She expressed understanding with the diagnosis(es) and plan. Pursuant to the emergency declaration under the [address] [place] [address], [facility] waiver authority and the [employer] and [employer] Act, this Virtual  Visit was conducted, with patient's consent, to reduce the patient's risk of exposure to COVID-19 and provide continuity of care for an established patient. Services were provided through a video synchronous discussion virtually to substitute for in-person clinic visit.     Santi Bergman MD

## 2021-04-23 ENCOUNTER — HOSPITAL ENCOUNTER (OUTPATIENT)
Dept: NON INVASIVE DIAGNOSTICS | Age: 61
Discharge: HOME | End: 2021-04-23
Payer: COMMERCIAL

## 2021-04-23 ENCOUNTER — TELEPHONE (OUTPATIENT)
Dept: FAMILY MEDICINE CLINIC | Age: 61
End: 2021-04-23

## 2021-04-23 DIAGNOSIS — M79.622 LEFT UPPER ARM PAIN: ICD-10-CM

## 2021-04-23 DIAGNOSIS — R00.2 PALPITATIONS: ICD-10-CM

## 2021-04-23 LAB
ATRIAL RATE: 60 BPM
CALCULATED P AXIS, ECG09: 42 DEGREES
CALCULATED R AXIS, ECG10: 0 DEGREES
CALCULATED T AXIS, ECG11: 49 DEGREES
DIAGNOSIS, 93000: NORMAL
P-R INTERVAL, ECG05: 168 MS
Q-T INTERVAL, ECG07: 434 MS
QRS DURATION, ECG06: 92 MS
QTC CALCULATION (BEZET), ECG08: 434 MS
VENTRICULAR RATE, ECG03: 60 BPM

## 2021-04-23 PROCEDURE — 93005 ELECTROCARDIOGRAM TRACING: CPT

## 2021-04-26 NOTE — TELEPHONE ENCOUNTER
Called and spoke with pt, and she has been advised and states understanding of results and agrees with plan.

## 2021-05-05 ENCOUNTER — OFFICE VISIT (OUTPATIENT)
Dept: CARDIOLOGY CLINIC | Age: 61
End: 2021-05-05
Payer: COMMERCIAL

## 2021-05-05 VITALS
OXYGEN SATURATION: 97 % | RESPIRATION RATE: 14 BRPM | BODY MASS INDEX: 39.05 KG/M2 | DIASTOLIC BLOOD PRESSURE: 80 MMHG | HEIGHT: 63 IN | WEIGHT: 220.4 LBS | SYSTOLIC BLOOD PRESSURE: 124 MMHG | HEART RATE: 60 BPM

## 2021-05-05 DIAGNOSIS — R06.83 SNORING: ICD-10-CM

## 2021-05-05 DIAGNOSIS — R07.9 CHEST PAIN, UNSPECIFIED TYPE: ICD-10-CM

## 2021-05-05 DIAGNOSIS — I10 ESSENTIAL HYPERTENSION WITH GOAL BLOOD PRESSURE LESS THAN 140/90: Primary | ICD-10-CM

## 2021-05-05 PROCEDURE — 99214 OFFICE O/P EST MOD 30 MIN: CPT | Performed by: INTERNAL MEDICINE

## 2021-05-05 NOTE — PROGRESS NOTES
Reason for Consult: left arm pain, palpitations. Referring: Sera Lizarraga MD    HPI: Navin Mallory is a 62 y.o. female with past medical history significant for hypertension is here for evaluation symptoms of left arm pain as well as palpitations. Symptoms started  few weeks to few months. Symptoms described as having dull aching pain in the left upper arm especially during nighttime and sometimes she will feel palpitations described as having feeling of her own heartbeat. No symptoms of exertional chest pain. No symptoms of lightheadedness, dizziness, presyncope syncope. She is concerned about the symptoms because her mother has pacemaker and has significant history of congestive heart failure and is currently in hospice. She is under stress due to taking care of her mother. She smokes tobacco.    EKG recently on April 23, 2021 was personally reviewed which demonstrated normal sinus rhythm with normal axis with normal intervals with normal ST segment. Lab results from August 13, 2020 were personally reviewed. These were all normal at that time. Plan:    1. Chest pain: Symptoms are atypical mostly present during rest and not present during exertion. Likely not related to heart however given her risk factors including age, family history and hypertension we will proceed with a exercise stress echocardiogram.  Also check CAC score. 2.  Palpitations: Symptoms are bit atypical and short-lived. EKG is normal.  We will continue to monitor. 3.  Hypertension: Blood pressure is controlled. Continue current medications. 4.  Snoring: Refer to Dr. Laura Razo. 5. Tobacco smoking: discussed tobacco cessation. 6. Phone follow-up of the test results. ATTENTION:   This medical record was transcribed using an electronic medical records/speech recognition system. Although proofread, it may and can contain electronic, spelling and other errors.   Corrections may be executed at a later time. Please feel free to contact us for any clarifications as needed.       Past Medical History:   Diagnosis Date    Fatty hernia of linea alba 2012    Fatty liver 4/3/2012    Gallstones 3/29/2012    GERD (gastroesophageal reflux disease)     Grave's disease     Hypercholesterolemia 2012    Hypertension     Hypokalemia 2013    LE (discoid lupus erythematosus)     LVH (left ventricular hypertrophy)     on echo    Nausea & vomiting     Severe, requests Scopolamine Patch    Tobacco abuse 3/20/2012            Past Surgical History:   Procedure Laterality Date    HX  SECTION      x2     HX HYSTERECTOMY  2008    HX KNEE ARTHROSCOPY      right knee             Family History   Problem Relation Age of Onset    Hypertension Mother     Cancer Father     Cancer Maternal Grandfather            Social History     Socioeconomic History    Marital status:      Spouse name: Not on file    Number of children: Not on file    Years of education: Not on file    Highest education level: Not on file   Occupational History    Not on file   Social Needs    Financial resource strain: Not on file    Food insecurity     Worry: Not on file     Inability: Not on file    Transportation needs     Medical: Not on file     Non-medical: Not on file   Tobacco Use    Smoking status: Current Every Day Smoker     Packs/day: 0.25     Years: 20.00     Pack years: 5.00    Smokeless tobacco: Never Used   Substance and Sexual Activity    Alcohol use: Yes     Comment: Rarely    Drug use: No    Sexual activity: Yes     Partners: Male   Lifestyle    Physical activity     Days per week: Not on file     Minutes per session: Not on file    Stress: Not on file   Relationships    Social connections     Talks on phone: Not on file     Gets together: Not on file     Attends Orthodoxy service: Not on file     Active member of club or organization: Not on file     Attends meetings of clubs or organizations: Not on file     Relationship status: Not on file    Intimate partner violence     Fear of current or ex partner: Not on file     Emotionally abused: Not on file     Physically abused: Not on file     Forced sexual activity: Not on file   Other Topics Concern    Not on file   Social History Narrative    Not on file         Allergies   Allergen Reactions    Bactrim [Sulfamethoprim Ds] Hives            Current Outpatient Medications   Medication Sig Dispense Refill    amLODIPine (Norvasc) 5 mg tablet Take 1 Tab by mouth daily. 30 Tab 1    atenoloL-chlorthalidone (TENORETIC) 100-25 mg per tablet TAKE 1 TABLET BY MOUTH DAILY 90 Tab 2    cimetidine (TAGAMET) 800 mg tab Take 400 mg by mouth two (2) times a day.  benzonatate (TESSALON) 200 mg capsule Take 1 Cap by mouth three (3) times daily as needed for Cough. 30 Cap 0    famotidine (PEPCID) 40 mg tablet Take 1 Tab by mouth two (2) times daily as needed (GERD). 90 Tab 0    diclofenac (VOLTAREN) 1 % gel Apply 2 g to affected area two (2) times a day. TO LEFT SHOULDER TWICE A DAY AS NEEDED, KEEP AREA UNCOVERED FOR AT LEAST 10 MIN 30 g 0    cyclobenzaprine (FLEXERIL) 10 mg tablet Take 0.5 Tabs by mouth three (3) times daily as needed for Muscle Spasm(s). 15 Tab 0        ROS:  12 point review of systems was performed.  All negative except for HPI     Physical Exam:  Visit Vitals  /80 (BP 1 Location: Right arm, BP Patient Position: Sitting)   Pulse 60   Resp 14   Ht 5' 3\" (1.6 m)   Wt 220 lb 6.4 oz (100 kg)   SpO2 97%   BMI 39.04 kg/m²       Gen:  Well-developed, well-nourished, in no acute distress  HEENT:  Pink conjunctivae, PERRL, hearing intact to voice, moist mucous membranes  Neck:  Supple, without masses, thyroid non-tender  Resp:  No accessory muscle use, clear breath sounds without wheezes rales or rhonchi  Card:  No murmurs, normal S1, S2 without thrills, bruits or peripheral edema  Abd:  Soft, non-tender, non-distended, normoactive bowel sounds are present, no palpable organomegaly and no detectable hernias  Lymph:  No cervical or inguinal adenopathy  Musc:  No cyanosis or clubbing  Skin:  No rashes or ulcers, skin turgor is good  Neuro:  Cranial nerves are grossly intact, no focal motor weakness, follows commands appropriately  Psych:  Good insight, oriented to person, place and time, alert     Labs:     Lab Results   Component Value Date/Time    WBC 6.0 08/13/2020 10:30 AM    HGB 14.9 08/13/2020 10:30 AM    Hemoglobin (POC) 13.6 08/24/2016 11:13 AM    HCT 45.7 08/13/2020 10:30 AM    Hematocrit (POC) 40 08/24/2016 11:13 AM    PLATELET 456 23/80/0073 10:30 AM    MCV 90 08/13/2020 10:30 AM     Lab Results   Component Value Date/Time    Glucose 92 08/13/2020 10:30 AM    Glucose (POC) 98 08/24/2016 11:13 AM    LDL, calculated 169 (H) 08/13/2020 10:30 AM    Creatinine (POC) 0.8 08/24/2016 11:13 AM    Creatinine 0.85 08/13/2020 10:30 AM      Lab Results   Component Value Date/Time    Cholesterol, total 240 (H) 08/13/2020 10:30 AM    HDL Cholesterol 42 08/13/2020 10:30 AM    LDL, calculated 169 (H) 08/13/2020 10:30 AM    Triglyceride 145 08/13/2020 10:30 AM     Lab Results   Component Value Date/Time    ALT (SGPT) 18 08/13/2020 10:30 AM    Alk.  phosphatase 64 08/13/2020 10:30 AM    Bilirubin, total 0.3 08/13/2020 10:30 AM    Albumin 4.1 08/13/2020 10:30 AM    Protein, total 6.6 08/13/2020 10:30 AM    PLATELET 985 73/63/3262 10:30 AM     No results found for: INR, INREXT, PTMR, PTP, PT1, PT2, INREXT   Lab Results   Component Value Date/Time    GFR est non-AA 75 08/13/2020 10:30 AM    GFRNA, POC >60 08/24/2016 11:13 AM    GFR est AA 87 08/13/2020 10:30 AM    GFRAA, POC >60 08/24/2016 11:13 AM    Creatinine 0.85 08/13/2020 10:30 AM    Creatinine (POC) 0.8 08/24/2016 11:13 AM    BUN 15 08/13/2020 10:30 AM    BUN (POC) 9 08/24/2016 11:13 AM    Sodium 143 08/13/2020 10:30 AM    Sodium (POC) 140 08/24/2016 11:13 AM    Potassium 3.7 08/13/2020 10:30 AM Potassium (POC) 3.3 (L) 08/24/2016 11:13 AM    Chloride 101 08/13/2020 10:30 AM    Chloride (POC) 100 08/24/2016 11:13 AM    CO2 30 (H) 08/13/2020 10:30 AM     No results found for: PSA, PSA2, Peggy Felt, PSAR3, [id number], [id number]  Lab Results   Component Value Date/Time    TSH 1.990 08/13/2020 10:30 AM    T4, Free 1.22 08/13/2020 10:30 AM      Lab Results   Component Value Date/Time    Glucose 92 08/13/2020 10:30 AM    Glucose (POC) 98 08/24/2016 11:13 AM      No results found for: CPK, RCK1, RCK2, RCK3, RCK4, CKMB, CKNDX, CKND1, TROPT, TROIQ, BNPP, BNP   No results found for: BNP, BNPP, BNPPPOC, XBNPT, BNPNT   Lab Results   Component Value Date/Time    Sodium 143 08/13/2020 10:30 AM    Potassium 3.7 08/13/2020 10:30 AM    Chloride 101 08/13/2020 10:30 AM    CO2 30 (H) 08/13/2020 10:30 AM    Anion gap 5 08/25/2016 05:03 AM    Glucose 92 08/13/2020 10:30 AM    BUN 15 08/13/2020 10:30 AM    Creatinine 0.85 08/13/2020 10:30 AM    BUN/Creatinine ratio 18 08/13/2020 10:30 AM    GFR est AA 87 08/13/2020 10:30 AM    GFR est non-AA 75 08/13/2020 10:30 AM    Calcium 9.8 08/13/2020 10:30 AM      Lab Results   Component Value Date/Time    Sodium 143 08/13/2020 10:30 AM    Potassium 3.7 08/13/2020 10:30 AM    Chloride 101 08/13/2020 10:30 AM    CO2 30 (H) 08/13/2020 10:30 AM    Anion gap 5 08/25/2016 05:03 AM    Glucose 92 08/13/2020 10:30 AM    BUN 15 08/13/2020 10:30 AM    Creatinine 0.85 08/13/2020 10:30 AM    BUN/Creatinine ratio 18 08/13/2020 10:30 AM    GFR est AA 87 08/13/2020 10:30 AM    GFR est non-AA 75 08/13/2020 10:30 AM    Calcium 9.8 08/13/2020 10:30 AM    Bilirubin, total 0.3 08/13/2020 10:30 AM    ALT (SGPT) 18 08/13/2020 10:30 AM    Alk.  phosphatase 64 08/13/2020 10:30 AM    Protein, total 6.6 08/13/2020 10:30 AM    Albumin 4.1 08/13/2020 10:30 AM    Globulin 3.9 08/25/2016 05:03 AM    A-G Ratio 1.6 08/13/2020 10:30 AM      No results found for: HBA1C, WTN3BQFB, HGBE8, YPI5LMIG, JNE3KFTS No results for input(s): CPK, CKMB, TROIQ in the last 72 hours. No lab exists for component: CKQMB, CPKMB        Problem List:     Problem List  Date Reviewed: 8/12/2020          Codes Class Noted    Severe obesity ([address] ICD-10-CM: E66.01  ICD-9-CM: 278.01  10/17/2019        Hypokalemia ICD-10-CM: E87.6  ICD-9-CM: 276.8  7/17/2013        Hypercholesterolemia ICD-10-CM: E78.00  ICD-9-CM: 272.0  5/11/2012        Fatty hernia of linea alba ICD-10-CM: K43.9  ICD-9-CM: 553.29  5/9/2012        Fatty liver ICD-10-CM: K76.0  ICD-9-CM: 571.8  4/3/2012        Gallstones ICD-10-CM: K80.20  ICD-9-CM: 574.20  3/29/2012        Discoid lupus ICD-10-CM: L93.0  ICD-9-CM: 695.4  3/20/2012        Essential hypertension with goal blood pressure less than 140/90 ICD-10-CM: I10  ICD-9-CM: 401.9  3/20/2012        GERD (gastroesophageal reflux disease) ICD-10-CM: K21.9  ICD-9-CM: 530.81  3/20/2012        LVH (left ventricular hypertrophy) ICD-10-CM: I51.7  ICD-9-CM: 429.3  3/20/2012        Grave's disease ICD-9-CM: 242.00  3/20/2012        Tobacco abuse ICD-10-CM: Z72.0  ICD-9-CM: 305.1  3/20/2012                Vikas K. Sofie Shone, MD, [facility]

## 2021-05-05 NOTE — PROGRESS NOTES
Chief Complaint   Patient presents with    Irregular Heart Beat    Hypertension     Visit Vitals  /80 (BP 1 Location: Right arm, BP Patient Position: Sitting)   Pulse 60   Resp 14   Ht 5' 3\" (1.6 m)   Wt 220 lb 6.4 oz (100 kg)   SpO2 97%   BMI 39.04 kg/m²     1. Have you been to the ER, urgent care clinic since your last visit? Hospitalized since your last visit? no    2. Have you seen or consulted any other health care providers outside of the [address] [place] [address] since your last visit? Include any pap smears or colon screening. .no    Health Maintenance Due   Topic Date Due    Pneumococcal 0-64 years (1 of 1 - PPSV23) Never done    COVID-19 Vaccine (1) Never done    DTaP/Tdap/Td series (1 - Tdap) Never done    Shingrix Vaccine Age 50> (1 of 2) Never done

## 2021-05-05 NOTE — PATIENT INSTRUCTIONS
Refer to Dr. Radha Castro for sleep evaluation. Stress Echo for chest pain. Phone followup of the test results.

## 2021-05-05 NOTE — PROGRESS NOTES
All orders entered per VO of Dr. Angela Gaitan:        Refer to Dr. Jennifer Osman for sleep evaluation. Stress Echo for chest pain. Phone followup of the test results.      REFERRAL GIVEN TO THE PT

## 2021-05-26 ENCOUNTER — ANCILLARY PROCEDURE (OUTPATIENT)
Dept: CARDIOLOGY CLINIC | Age: 61
End: 2021-05-26
Payer: COMMERCIAL

## 2021-05-26 ENCOUNTER — APPOINTMENT (OUTPATIENT)
Dept: CARDIOLOGY CLINIC | Age: 61
End: 2021-05-26

## 2021-05-26 VITALS — BODY MASS INDEX: 38.98 KG/M2 | WEIGHT: 220 LBS | HEIGHT: 63 IN

## 2021-05-26 DIAGNOSIS — R07.9 CHEST PAIN, UNSPECIFIED TYPE: ICD-10-CM

## 2021-05-26 LAB
STRESS BASELINE DIAS BP: 80 MMHG
STRESS BASELINE HR: 64 BPM
STRESS BASELINE SYS BP: 126 MMHG
STRESS ESTIMATED WORKLOAD: 7.5 METS
STRESS EXERCISE DUR MIN: NORMAL
STRESS O2 SAT PEAK: 94 %
STRESS O2 SAT REST: 97 %
STRESS PEAK DIAS BP: 88 MMHG
STRESS PEAK SYS BP: 160 MMHG
STRESS PERCENT HR ACHIEVED: 73 %
STRESS POST PEAK HR: 116 BPM
STRESS RATE PRESSURE PRODUCT: NORMAL BPM*MMHG
STRESS ST DEPRESSION: 0 MM
STRESS ST ELEVATION: 0 MM
STRESS TARGET HR: 160 BPM

## 2021-05-26 PROCEDURE — 93351 STRESS TTE COMPLETE: CPT | Performed by: INTERNAL MEDICINE

## 2021-05-28 ENCOUNTER — OFFICE VISIT (OUTPATIENT)
Dept: SLEEP MEDICINE | Age: 61
End: 2021-05-28
Payer: COMMERCIAL

## 2021-05-28 ENCOUNTER — DOCUMENTATION ONLY (OUTPATIENT)
Dept: SLEEP MEDICINE | Age: 61
End: 2021-05-28

## 2021-05-28 VITALS
HEIGHT: 63 IN | BODY MASS INDEX: 38.8 KG/M2 | HEART RATE: 60 BPM | WEIGHT: 219 LBS | DIASTOLIC BLOOD PRESSURE: 80 MMHG | SYSTOLIC BLOOD PRESSURE: 136 MMHG | OXYGEN SATURATION: 97 % | TEMPERATURE: 98.2 F

## 2021-05-28 DIAGNOSIS — G47.00 INSOMNIA, UNSPECIFIED TYPE: ICD-10-CM

## 2021-05-28 DIAGNOSIS — G47.19 EXCESSIVE DAYTIME SLEEPINESS: ICD-10-CM

## 2021-05-28 DIAGNOSIS — G47.33 OSA (OBSTRUCTIVE SLEEP APNEA): Primary | ICD-10-CM

## 2021-05-28 PROCEDURE — 99204 OFFICE O/P NEW MOD 45 MIN: CPT | Performed by: SPECIALIST

## 2021-05-28 NOTE — PATIENT INSTRUCTIONS
Learning About Sleep Apnea What is it? Sleep apnea means that breathing stops for short periods during sleep. When you stop breathing or have reduced airflow into your lungs during sleep, you don't sleep well and you can be very tired during the day. The oxygen levels in your blood may go down, and carbon dioxide levels go up. It may lead to other problems, such as high blood pressure and heart disease. Sleep apnea can range from mild to severe, based on how often breathing stops during sleep. Breathing may stop as few as 5 times an hour (mild apnea) to 30 or more times an hour (severe apnea). Obstructive sleep apnea is the most common type. This most often occurs because your airways are blocked or partly blocked. Central sleep apnea is less common. It happens when the brain has trouble controlling breathing. Some people have both types. That's called complex sleep apnea. What are the symptoms? There are symptoms of sleep apnea that you may notice and symptoms that others may notice when you're asleep. Symptoms you may notice include: · Feeling extremely sleepy during the day. · Feeling unrefreshed or tired after a night's sleep. · Problems with memory and concentration, or mood changes. · Morning headaches. · Getting up often during the night to urinate. · A dry mouth or sore throat in the morning. Your bed partner may notice that you: 
· Have episodes of not breathing. · Snore loudly. Almost all people who have sleep apnea snore. But not all people who snore have sleep apnea. · Toss and turn during sleep. · Have nighttime choking or gasping spells. How is it diagnosed? Your doctor will probably do a physical exam and ask about your past health. The doctor may also ask you or your bed partner about your snoring and sleep behavior and how tired you feel during the day. Your doctor may suggest a sleep study.  Sleep studies are a series of tests that look at what happens to the body during sleep. They check for how often you stop breathing or have too little air flowing into your lungs during sleep. They also find out how much oxygen you have in your blood during sleep. A sleep study may take place in your home. Or it might take place at a sleep center, where you will spend the night. If your sleep apnea doesn't improve with treatment, you may have more tests to find out what's causing it. How is it treated? You may be able to help treat sleep apnea by making some lifestyle changes. You could try to lose weight, sleep on your side, and avoid alcohol and medicines like sedatives before bed. Sleep apnea is often treated with machines that deliver air through a mask to help keep your airways open. These include: 
· Continuous positive airway pressure (CPAP). This increases air pressure in your throat. It keeps your airway open when you breathe in. It's the most common device. · Bilevel positive airway pressure (BiPAP). This uses different air pressures when you breathe in and out. · Adaptive servo ventilation (ASV). It senses pauses in breathing and adjusts air pressure. It's mostly used for central sleep apnea. You can also try oral breathing devices or nasal devices. If your tonsils or other tissues are blocking your airway, your doctor may suggest surgery to open the airway. How can you care for yourself at home? · Lose weight, if needed. · Sleep on your side. It may help mild apnea. · Avoid alcohol and medicines such as sleeping pills, opioids, or sedatives before bed. · Don't smoke. If you need help quitting, talk to your doctor. · Prop up the head of your bed. · Treat breathing problems, such as a stuffy nose, that are caused by a cold or allergies. · Try a continuous positive airway pressure (CPAP) breathing machine if your doctor recommends it. · If CPAP doesn't work for you, ask your doctor if you can try other masks, settings, or breathing machines.  
· Try oral breathing devices or other nasal devices. · Talk to your doctor if your nose feels dry or bleeds, or if it gets runny or stuffy when you use a breathing machine. · Tell your doctor if you're sleepy during the day and it affects your daily life. Don't drive or operate machinery when you're drowsy. Where can you learn more? Go to http://www.gray.com/ Enter S121 in the search box to learn more about \"Learning About Sleep Apnea. \" Current as of: October 26, 2020               Content Version: 12.8 © 9816-4773 [employer]. Care instructions adapted under license by [employer] (which disclaims liability or warranty for this information). If you have questions about a medical condition or this instruction, always ask your healthcare professional. [address] any warranty or liability for your use of this information.

## 2021-05-28 NOTE — PROGRESS NOTES
[address] [place], [address]  Tel.  [phone]  Fax. 100 [facility] 60  [place], [address] [place]  Tel.  [phone]  Fax. [phone] [address] [facility] [place]   Tel.  [phone]  Fax. [phone]       Chief Complaint       Chief Complaint   Patient presents with    Sleep Problem     NP; ref Dr Missy Funes; snore, eval for javier       HPI      Jordan Sharma is 62 y.o. female seen for evaluation of a sleep disorder. Patient has history of difficulties with sleep initiation and maintenance. 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 She may not fall asleep until 3 AM and will awaken at 9 AM.  She will awaken several times during the night. She has been told of prominent snoring, waking with a gasp or snort. She denies vivid dreaming nightmares, sleep talking or sleepwalking, bruxism or nocturnal incontinence, abnormal arm or leg movements, hypnagogic hallucinations, sleep paralysis or cataplexy. The patient has not undergone diagnostic testing for the current problems. [place] Sleepiness Score: 17       Allergies   Allergen Reactions    Bactrim [Sulfamethoprim Ds] Hives       Current Outpatient Medications   Medication Sig Dispense Refill    amLODIPine (Norvasc) 5 mg tablet Take 1 Tab by mouth daily. 30 Tab 1    atenoloL-chlorthalidone (TENORETIC) 100-25 mg per tablet TAKE 1 TABLET BY MOUTH DAILY 90 Tab 2    cimetidine (TAGAMET) 800 mg tab Take 400 mg by mouth two (2) times a day. (Patient not taking: Reported on 5/28/2021)      benzonatate (TESSALON) 200 mg capsule Take 1 Cap by mouth three (3) times daily as needed for Cough. (Patient not taking: Reported on 5/28/2021) 30 Cap 0    famotidine (PEPCID) 40 mg tablet Take 1 Tab by mouth two (2) times daily as needed (GERD).  (Patient not taking: Reported on 5/28/2021) 90 Tab 0    diclofenac (VOLTAREN) 1 % gel Apply 2 g to affected area two (2) times a day. TO LEFT SHOULDER TWICE A DAY AS NEEDED, KEEP AREA UNCOVERED FOR AT LEAST 10 MIN (Patient not taking: Reported on 2021) 30 g 0    cyclobenzaprine (FLEXERIL) 10 mg tablet Take 0.5 Tabs by mouth three (3) times daily as needed for Muscle Spasm(s). (Patient not taking: Reported on 2021) 15 Tab 0        She  has a past medical history of Fatty hernia of linea alba (2012), Fatty liver (4/3/2012), Gallstones (3/29/2012), GERD (gastroesophageal reflux disease), Grave's disease, Hypercholesterolemia (2012), Hypertension, Hypokalemia (2013), LE (discoid lupus erythematosus), LVH (left ventricular hypertrophy), Nausea & vomiting, and Tobacco abuse (3/20/2012). She  has a past surgical history that includes hx  section; hx hysterectomy (); and hx knee arthroscopy. She family history includes Cancer in her father and maternal grandfather; Hypertension in her mother. She  reports that she has been smoking. She has a 5.00 pack-year smoking history. She has never used smokeless tobacco. She reports current alcohol use. She reports that she does not use drugs. Review of Systems:  Review of Systems   Constitutional: Negative for chills and fever. HENT: Negative for hearing loss and tinnitus. Eyes: Negative for blurred vision and double vision. Respiratory: Negative for cough and shortness of breath. Cardiovascular: Positive for palpitations. Negative for chest pain. Gastrointestinal: Negative for abdominal pain and heartburn. Genitourinary: Positive for frequency. Negative for urgency. Musculoskeletal: Negative for back pain and neck pain. Skin: Negative for itching and rash. Neurological: Negative for dizziness and headaches. Psychiatric/Behavioral: Negative for depression. The patient is not nervous/anxious.           Objective:     Visit Vitals  /80 (BP 1 Location: Left upper arm, BP Patient Position: Sitting, BP Cuff Size: Adult long)   Pulse 60   Temp 98.2 °F (36.8 °C) (Temporal)   Ht 5' 3\" (1.6 m)   Wt 219 lb (99.3 kg)   SpO2 97%   BMI 38.79 kg/m²     Body mass index is 38.79 kg/m². General:   Conversant, cooperative   Eyes:  Pupils equal and reactive, no nystagmus   Oropharynx:   Mallampati score IV, tongue normal   Tonsils:   tonsils normal   Neck:   No carotid bruits; Neck circ. in \"inches\": 15   Chest/Lungs:  Clear on auscultation    CVS:  Normal rate, regular rhythm   Skin:  Warm to touch; no obvious rashes   Neuro:  Speech fluent, face symmetrical, tongue movement normal   Psych:  Normal affect,  normal countenance        Assessment:       ICD-10-CM ICD-9-CM    1. HENRY (obstructive sleep apnea)  G47.33 327.23    2. Insomnia, unspecified type  G47.00 780.52    3. Excessive daytime sleepiness  G47.19 780.54        Potential sleep disordered breathing. As patient provides care for her mother she will be evaluated with a home sleep test.  Results will be reviewed with her. She will document the time spent asleep during the home test.      Plan:     No orders of the defined types were placed in this encounter. * Patient has a history and examination consistent with the diagnosis of sleep apnea. *Home sleep testing was ordered for initial evaluation. * She was provided information on sleep apnea including corresponding risk factors and the importance of proper treatment. * Treatment options if indicated were reviewed today. Instructions:    o The patient would benefit from weight reduction measures. o Do not engage in activities requiring a normal degree of alertness if fatigue is present. o The patient understands that untreated or undertreated sleep apnea could impair judgement and the ability to function normally during the day.  o Call or return if symptoms worsen or persist.          Verner Rondo, MD, FAASM  Electronically signed 05/28/21       This note was created using voice recognition software. Despite editing, there may be syntax errors. This note will not be viewable in [address]

## 2021-06-01 ENCOUNTER — OFFICE VISIT (OUTPATIENT)
Dept: SLEEP MEDICINE | Age: 61
End: 2021-06-01

## 2021-06-01 DIAGNOSIS — G47.33 OSA (OBSTRUCTIVE SLEEP APNEA): Primary | ICD-10-CM

## 2021-06-01 NOTE — PROGRESS NOTES
[address] [facility] [place] [address], [address]  Tel.  [phone]  Fax. 100 [facility] 60  [place], [address] [facility]  Tel.  [phone]  Fax. [phone] [address] [place]   Tel.  [phone]  Fax. [phone]       S>Anna Mendosa is a 62 y.o. female seen today to receive a home sleep testing unit (HST). · Patient was educated on proper hookup and operation of the HST. · Instruction forms and documentation were reviewed and signed. · The patient demonstrated good understanding of the HST.    O>    There were no vitals taken for this visit. A>  No diagnosis found. P>  · General information regarding operations and maintenance of the device was provided. · She was provided information on sleep apnea including coresponding risk factors and the importance of proper treatment. · Follow-up appointment was made to return the HST. She will be contacted once the results have been reviewed. · She was asked to contact our office for any problems regarding her home sleep test study.   · [facility] SN # [id number]

## 2021-06-09 ENCOUNTER — TELEPHONE (OUTPATIENT)
Dept: SLEEP MEDICINE | Age: 61
End: 2021-06-09

## 2021-06-09 DIAGNOSIS — G47.33 OSA (OBSTRUCTIVE SLEEP APNEA): Primary | ICD-10-CM

## 2021-06-10 NOTE — TELEPHONE ENCOUNTER
Reviewed sleep study results with patient. She expressed understanding and is willing to proceed with a repeat HSAT. We will obtain insurance authorization if necessary and schedule pickup.

## 2021-06-14 ENCOUNTER — TELEPHONE (OUTPATIENT)
Dept: SLEEP MEDICINE | Age: 61
End: 2021-06-14

## 2021-06-14 DIAGNOSIS — G47.33 OSA (OBSTRUCTIVE SLEEP APNEA): Primary | ICD-10-CM

## 2021-06-16 ENCOUNTER — TELEPHONE (OUTPATIENT)
Dept: CARDIOLOGY CLINIC | Age: 61
End: 2021-06-16

## 2021-06-16 DIAGNOSIS — R07.9 CHEST PAIN, UNSPECIFIED TYPE: Primary | ICD-10-CM

## 2021-06-16 NOTE — PROGRESS NOTES
Per Dr. Gerry Cardona, \"Pls change the order to [address] as patient had suboptimal stress Echo  \"  Message sent to [facility] to schedule the pt

## 2021-06-16 NOTE — TELEPHONE ENCOUNTER
Patient would like to know the results to her stress test done on 5/26/21, please advise          [phone]

## 2021-06-16 NOTE — TELEPHONE ENCOUNTER
Returned pt call, ID [id number]. Pt had  called inquiring about her stress test results. I informed her that they were inconclusive and that we need to schedule a Lexiscan stress test. I explained the procedure. Pt expressed understanding and agreement. Pt has no further questions or concerns at this time. She will call back to schedule.

## 2021-06-22 DIAGNOSIS — I10 ESSENTIAL HYPERTENSION WITH GOAL BLOOD PRESSURE LESS THAN 140/90: ICD-10-CM

## 2021-06-22 RX ORDER — AMLODIPINE BESYLATE 5 MG/1
TABLET ORAL
Qty: 90 TABLET | Refills: 3 | Status: SHIPPED | OUTPATIENT
Start: 2021-06-22 | End: 2022-04-19 | Stop reason: SDUPTHER

## 2021-07-14 ENCOUNTER — HOSPITAL ENCOUNTER (OUTPATIENT)
Dept: SLEEP MEDICINE | Age: 61
Discharge: HOME | End: 2021-07-14
Payer: COMMERCIAL

## 2021-07-14 VITALS
HEART RATE: 63 BPM | OXYGEN SATURATION: 98 % | DIASTOLIC BLOOD PRESSURE: 78 MMHG | WEIGHT: 220 LBS | SYSTOLIC BLOOD PRESSURE: 138 MMHG | BODY MASS INDEX: 38.98 KG/M2 | HEIGHT: 63 IN | TEMPERATURE: 96.2 F

## 2021-07-14 DIAGNOSIS — G47.33 OSA (OBSTRUCTIVE SLEEP APNEA): ICD-10-CM

## 2021-07-14 PROCEDURE — 95810 POLYSOM 6/> YRS 4/> PARAM: CPT | Performed by: SPECIALIST

## 2021-07-15 ENCOUNTER — DOCUMENTATION ONLY (OUTPATIENT)
Dept: SLEEP MEDICINE | Age: 61
End: 2021-07-15

## 2021-07-15 NOTE — PROGRESS NOTES
[address] [facility] [place], [address]  Tel.  [phone]  Fax. 100 [facility] 60  [address] [facility] [address], [address]  Tel.  [phone]  Fax. [phone] [address]  Tel.  [phone]  Fax. [phone]     Sleep Study Technical Notes        PRE-Test:  Sirisha Mendosa (: 1960) arrived in the lobby. Patient was greeted, temperature checked (96.2 ) and screening questions asked. The patient was taken to the Sleep Center and taken directly to his/her room. BP (138/78) and SaO2 (98) were taken. Weight per patient (220). Procedure explained to the patient and questions were answered. The patient expressed understanding of the procedure. Electrodes were applied without incident. The patient was placed in bed and the study was started.  PAP mask acclimation for **min. Patient did/didn't tolerate mask. Acquisition Notes:   Lights off: 11:03pm     Respiratory events: Apnea, Hypopnea noted during Rem.  ECG:  NSR   PAP titration: N/A   Desensitization Mask(s) Used: N/A   Other comments: Patient slept supine, left and right side. Sleep stages 1, 2, 3 and rem noted. Restroom x2. Average Heart Rate during sleep 63.4 BPM. Min SpO2 77%. o Patient had caregiver in attendance:  Y/N  o Patient watched TV or on phone after lights out for ** hours  o Patient slept with positional therapy:  Y/N  o Patient slept with head of bed elevated:  o Patient wore an oral appliance:  Y/N  o Patient to bathroom 2 times  o Pediatric Patient:  - Parent accompanied patient:  Y/N  - Parent slept in bed with patient:  Y/N      POST Test:   Patient was awakened. Electrodes were removed. The patient was discharged after answering the Post Questionnaire. Patient stated that he/she was alert and ok to drive.  Equipment and room cleaned per infection control policy.

## 2021-07-19 RX ORDER — ATENOLOL AND CHLORTHALIDONE TABLET 100; 25 MG/1; MG/1
TABLET ORAL
Qty: 90 TABLET | Refills: 0 | Status: SHIPPED | OUTPATIENT
Start: 2021-07-19 | End: 2021-09-19

## 2021-07-26 ENCOUNTER — TELEPHONE (OUTPATIENT)
Dept: SLEEP MEDICINE | Age: 61
End: 2021-07-26

## 2021-07-26 DIAGNOSIS — G47.33 OSA (OBSTRUCTIVE SLEEP APNEA): Primary | ICD-10-CM

## 2021-07-26 NOTE — TELEPHONE ENCOUNTER
Polysomnogram performed for potential sleep disordered breathing.  389.1 minutes recorded of which 244.5 minutes spent asleep with a sleep efficiency of 62.8%. Sleep onset prolonged at 93.3 minutes; REM onset at 78 minutes with total REM representing 21.5% of sleep time. All sleep stages were observed. 60 respiratory events occurred of which 27 were hypopnea and 33 apnea (17 central, 16 obstructive). The overall apnea-hypopnea index was 14.7/h. Events were essentially all REM related with the REM associated AHI of 66.3/h. Minimal SaO2 89%. Impression: Sleep disordered breathing, severe in rem sleep. Recommendation: APAP 6-16 cm    Sleep technologist: Please review the study results with the patient. Order has been entered for APAP 6-16 cm. Please schedule first compliance appointment.

## 2021-07-28 ENCOUNTER — OFFICE VISIT (OUTPATIENT)
Dept: FAMILY MEDICINE CLINIC | Age: 61
End: 2021-07-28
Payer: COMMERCIAL

## 2021-07-28 VITALS
SYSTOLIC BLOOD PRESSURE: 126 MMHG | OXYGEN SATURATION: 100 % | BODY MASS INDEX: 38.97 KG/M2 | RESPIRATION RATE: 18 BRPM | WEIGHT: 220 LBS | TEMPERATURE: 97.3 F | DIASTOLIC BLOOD PRESSURE: 75 MMHG | HEART RATE: 61 BPM

## 2021-07-28 DIAGNOSIS — I10 ESSENTIAL HYPERTENSION WITH GOAL BLOOD PRESSURE LESS THAN 140/90: ICD-10-CM

## 2021-07-28 DIAGNOSIS — S46.912A MUSCLE STRAIN OF LEFT UPPER ARM, INITIAL ENCOUNTER: Primary | ICD-10-CM

## 2021-07-28 PROCEDURE — 99214 OFFICE O/P EST MOD 30 MIN: CPT | Performed by: FAMILY MEDICINE

## 2021-07-28 RX ORDER — DICLOFENAC SODIUM 10 MG/G
4 GEL TOPICAL 4 TIMES DAILY
Qty: 1 EACH | Refills: 0 | Status: SHIPPED | OUTPATIENT
Start: 2021-07-28 | End: 2022-10-31

## 2021-07-28 NOTE — PROGRESS NOTES
Chief Complaint   Patient presents with    Arm Pain     Left arm 7/10, x 2 days. 1. Have you been to the ER, urgent care clinic since your last visit? Hospitalized since your last visit? No    2. Have you seen or consulted any other health care providers outside of the [address] [place] [address] since your last visit? Include any pap smears or colon screening.  No

## 2021-07-28 NOTE — PROGRESS NOTES
HISTORY OF PRESENT ILLNESS  Harvey Mendosa is a 62 y.o. female. Patient presents with:  Arm Pain: Left arm 7/10, x 2 days. Her arm is getting better. The pain is in the anterior upper arm and may have pulled it moving her mother. She is taking 400 mg of Motrin which only helps for about 3 hours. She is also due for follow up on her HTN. Review of Systems   Eyes: Negative for blurred vision. Respiratory: Negative for shortness of breath. Cardiovascular: Negative for chest pain. Musculoskeletal:        No arm swelling   Skin:        No arm redness or warmth   Neurological: Negative for dizziness, sensory change, speech change, focal weakness and headaches. Visit Vitals  /75 (BP 1 Location: Right arm, BP Patient Position: Sitting, BP Cuff Size: Large adult)   Pulse 61   Temp 97.3 °F (36.3 °C) (Temporal)   Resp 18   Wt 220 lb (99.8 kg)   SpO2 100%   BMI 38.97 kg/m²     Physical Exam  Vitals and nursing note reviewed. Constitutional:       General: She is not in acute distress. Appearance: She is well-developed. She is not diaphoretic. Cardiovascular:      Rate and Rhythm: Normal rate and regular rhythm. Heart sounds: Normal heart sounds. No murmur heard. No friction rub. No gallop. Pulmonary:      Effort: Pulmonary effort is normal. No respiratory distress. Breath sounds: Normal breath sounds. No wheezing or rales. Musculoskeletal:      Left upper arm: Tenderness present. No swelling, edema or deformity. Comments: There is mild tenderness over the left anterior deltoid   Skin:     General: Skin is warm and dry. Neurological:      Mental Status: She is alert and oriented to person, place, and time. ASSESSMENT and PLAN    ICD-10-CM ICD-9-CM    1. Muscle strain of left upper arm, initial encounter  S46.912A 840.9 diclofenac (VOLTAREN) 1 % gel   2.  Essential hypertension with goal blood pressure less than 140/90  C07 223.1 METABOLIC PANEL, BASIC Likely arm strain  Blood pressure controlled  Stop Motrin  Diclofenac get prn, heat, rest  Labs per orders. Follow-up and Dispositions    · Return in about 6 months (around 1/28/2022) for blood pressure. Reviewed plan of care. Patient has provided input and agrees with goals.

## 2021-08-04 ENCOUNTER — TELEPHONE (OUTPATIENT)
Dept: SLEEP MEDICINE | Age: 61
End: 2021-08-04

## 2021-08-06 ENCOUNTER — DOCUMENTATION ONLY (OUTPATIENT)
Dept: SLEEP MEDICINE | Age: 61
End: 2021-08-06

## 2021-08-06 NOTE — PROGRESS NOTES
Order faxed, patient informed. Patient said she will call back to schedule 1st adherence appointment.

## 2021-08-21 LAB
BUN SERPL-MCNC: 13 MG/DL (ref 8–27)
BUN/CREAT SERPL: 20 (ref 12–28)
CALCIUM SERPL-MCNC: 9.5 MG/DL (ref 8.7–10.3)
CHLORIDE SERPL-SCNC: 102 MMOL/L (ref 96–106)
CO2 SERPL-SCNC: 27 MMOL/L (ref 20–29)
CREAT SERPL-MCNC: 0.66 MG/DL (ref 0.57–1)
GLUCOSE SERPL-MCNC: 133 MG/DL (ref 65–99)
POTASSIUM SERPL-SCNC: 3.5 MMOL/L (ref 3.5–5.2)
SODIUM SERPL-SCNC: 142 MMOL/L (ref 134–144)

## 2021-08-29 DIAGNOSIS — R73.09 ELEVATED GLUCOSE: Primary | ICD-10-CM

## 2021-09-19 RX ORDER — ATENOLOL AND CHLORTHALIDONE TABLET 100; 25 MG/1; MG/1
TABLET ORAL
Qty: 90 TABLET | Refills: 3 | Status: SHIPPED | OUTPATIENT
Start: 2021-09-19 | End: 2022-04-19 | Stop reason: SDUPTHER

## 2022-03-08 ENCOUNTER — TRANSCRIBE ORDER (OUTPATIENT)
Dept: SCHEDULING | Age: 62
End: 2022-03-08

## 2022-03-08 DIAGNOSIS — Z12.31 SCREENING MAMMOGRAM FOR HIGH-RISK PATIENT: Primary | ICD-10-CM

## 2022-03-19 PROBLEM — E66.01 SEVERE OBESITY (HCC): Status: ACTIVE | Noted: 2019-10-17

## 2022-04-08 ENCOUNTER — HOSPITAL ENCOUNTER (OUTPATIENT)
Dept: MAMMOGRAPHY | Age: 62
Discharge: HOME | End: 2022-04-08
Attending: FAMILY MEDICINE
Payer: COMMERCIAL

## 2022-04-08 DIAGNOSIS — Z12.31 SCREENING MAMMOGRAM FOR HIGH-RISK PATIENT: ICD-10-CM

## 2022-04-08 PROCEDURE — 77067 SCR MAMMO BI INCL CAD: CPT

## 2022-04-19 DIAGNOSIS — I10 ESSENTIAL HYPERTENSION WITH GOAL BLOOD PRESSURE LESS THAN 140/90: ICD-10-CM

## 2022-04-19 RX ORDER — AMLODIPINE BESYLATE 5 MG/1
5 TABLET ORAL DAILY
Qty: 90 TABLET | Refills: 0 | Status: SHIPPED | OUTPATIENT
Start: 2022-04-19 | End: 2022-10-11

## 2022-04-19 RX ORDER — ATENOLOL AND CHLORTHALIDONE TABLET 100; 25 MG/1; MG/1
1 TABLET ORAL DAILY
Qty: 90 TABLET | Refills: 0 | Status: SHIPPED | OUTPATIENT
Start: 2022-04-19 | End: 2022-07-16

## 2022-04-19 NOTE — TELEPHONE ENCOUNTER
Pt requesting refill for her blood pressure medications, was not aware pharmacy had not requested it and is now out of medication.  Yue

## 2022-05-11 ENCOUNTER — VIRTUAL VISIT (OUTPATIENT)
Dept: FAMILY MEDICINE CLINIC | Age: 62
End: 2022-05-11
Payer: COMMERCIAL

## 2022-05-11 DIAGNOSIS — J06.9 VIRAL UPPER RESPIRATORY TRACT INFECTION: ICD-10-CM

## 2022-05-11 DIAGNOSIS — R05.9 COUGH: Primary | ICD-10-CM

## 2022-05-11 PROCEDURE — 99213 OFFICE O/P EST LOW 20 MIN: CPT | Performed by: FAMILY MEDICINE

## 2022-05-11 RX ORDER — FLUTICASONE PROPIONATE 50 MCG
2 SPRAY, SUSPENSION (ML) NASAL DAILY
Qty: 1 EACH | Refills: 3 | Status: SHIPPED | OUTPATIENT
Start: 2022-05-11

## 2022-05-11 RX ORDER — GUAIFENESIN 600 MG/1
600 TABLET, EXTENDED RELEASE ORAL 2 TIMES DAILY
Qty: 30 TABLET | Refills: 0 | Status: SHIPPED | OUTPATIENT
Start: 2022-05-11

## 2022-05-11 RX ORDER — ALBUTEROL SULFATE 90 UG/1
2 AEROSOL, METERED RESPIRATORY (INHALATION)
Qty: 2 EACH | Refills: 1 | Status: SHIPPED | OUTPATIENT
Start: 2022-05-11

## 2022-05-11 NOTE — PROGRESS NOTES
Emma Martinez is a 65 y.o. female who was seen by synchronous (real-time) audio-video technology on 5/11/2022. Consent: Emma Martinez, who was seen by synchronous (real-time) audio-video technology, and/or her healthcare decision maker, is aware that this patient-initiated, Telehealth encounter on 5/11/2022 is a billable service, with coverage as determined by her insurance carrier. She is aware that she may receive a bill and has provided verbal consent to proceed: Yes. Assessment & Plan:       ICD-10-CM ICD-9-CM    1. Cough  R05.9 786.2 albuterol (PROVENTIL HFA, VENTOLIN HFA, PROAIR HFA) 90 mcg/actuation inhaler      guaiFENesin ER (MUCINEX) 600 mg ER tablet   2. Viral upper respiratory tract infection  J06.9 465.9      Upper respiratory infections can be both bacterial and viral but in this case we suspect viral. Antibiotics are only indicated when bacterial infections are either strongly suspected or confirmed. Supportive care is appropriate in both cases. Patients with URIs benefit from humidified air, increased fluid consumption, over the counter pain and fever reducers (Ibuprofen, acetaminophen). If not contraindicated, may also use over the counter cough/cold medications, however patients with high blood pressure or risk factors for stroke should not use decongestant medications such as phenylephrine or pseudoephedrine. Nasal saline rinses are appropriate for use, and in general the use of Fluticasone nasal spray (2 sprays in each nostril once daily) can help with congestion--this is available over the counter. For sore throat over the counter cough drops and sore throat drops (for example Cepacol or Chloraseptic) can be used as well as salt water gargles and hot or cold beverages for comfort as needed. Over the counter cough medications can be used, as can honey for cough suppression.   If symptoms are not improved by 10-14 days or are improving then acutely worsen, patient is recommended to return to the office for re-evaluation of symptoms. Tested negative for covid but may want to take precautions in case this was a false neg        Pt was counseled on risks, benefits and alternatives of treatment options. All questions were asked and answered and the patient was agreeable with the treatment plan as outlined. Subjective:   Lin Anderson is a 65 y.o. female who was seen for Cold Symptoms (uri s/s covid neg at home)      Pt tells me she has been sick since Sunday  She's getting somewhat better  She's coughing-harsh and has a lot of mucous  She started taking coricidin flu and cold  She has been sitting out on the porch, hasn't usually had problems with allergy in the past  + sneezing/watery nose    Moved over to claritin, getting some progress, still with some chills and poor taste     Took a covid test at home and was negative    Subjective fever  Started with the headaches      Medications, allergies, PMH, PSH, SOCH, [facility] reviewed and updated per routine protocol, see chart for review and changes if not noted here. ROS  A 12 point review of systems was negative except as noted here or in the HPI.     Objective:   Vital Signs: (As obtained by patient/caregiver at home)  Patient-Reported Vitals 4/22/2021   Patient-Reported Weight -   Patient-Reported Height -   Patient-Reported Pulse 68   Patient-Reported Systolic  298   Patient-Reported Diastolic 93        [INSTRUCTIONS:  \"[x]\" Indicates a positive item  \"[]\" Indicates a negative item  -- DELETE ALL ITEMS NOT EXAMINED]    Constitutional: [x] Appears well-developed and well-nourished [x] No apparent distress      [] Abnormal -     Mental status: [x] Alert and awake  [x] Oriented to person/place/time [x] Able to follow commands    [] Abnormal -     Eyes:   EOM    [x]  Normal    [] Abnormal -   Sclera  [x]  Normal    [] Abnormal -          Discharge [x]  None visible   [] Abnormal -     HENT: [x] Normocephalic, atraumatic  [] Abnormal -   [x] Mouth/Throat: Mucous membranes are moist    External Ears [x] Normal  [] Abnormal -    Neck: [x] No visualized mass [] Abnormal -     Pulmonary/Chest: [x] Respiratory effort normal   [x] No visualized signs of difficulty breathing or respiratory distress        [] Abnormal -      Musculoskeletal:   [] Normal gait with no signs of ataxia         [x] Normal range of motion of neck        [] Abnormal -     Neurological:        [x] No Facial Asymmetry (Cranial nerve 7 motor function) (limited exam due to video visit)          [x] No gaze palsy        [] Abnormal -          Skin:        [x] No significant exanthematous lesions or discoloration noted on facial skin         [] Abnormal -            Psychiatric:       [x] Normal Affect [] Abnormal -        [x] No Hallucinations    Other pertinent observable physical exam findings:congestion (nasal) , mild cough (productive), no distress    We discussed the expected course, resolution and complications of the diagnosis(es) in detail. Medication risks, benefits, costs, interactions, and alternatives were discussed as indicated. I advised her to contact the office if her condition worsens, changes or fails to improve as anticipated. She expressed understanding with the diagnosis(es) and plan. Nico Joyce is a 65 y.o. female who was evaluated by a video visit encounter for concerns as above. Patient identification was verified prior to start of the visit. A caregiver was present when appropriate. Due to this being a TeleHealth encounter ([facility]), evaluation of the following organ systems was limited: Vitals/Constitutional/EENT/Resp/CV/GI//MS/Neuro/Skin/Heme-Lymph-Imm.   Pursuant to the emergency declaration under the [facility], 1135 waiver authority and the [employer] Act, this Virtual  Visit was conducted, with patient's (and/or legal guardian's) consent, to reduce the patient's risk of exposure to COVID-19 and provide necessary medical care. Services were provided through a video synchronous discussion virtually to substitute for in-person clinic visit. Patient and provider were located at their individual homes. Eleazar Jane MD  [facility]  05/11/22 12:00 PM     Portions of this note may have been populated using smart dictation software and may have \"sounds-like\" errors present.

## 2022-07-16 RX ORDER — ATENOLOL AND CHLORTHALIDONE TABLET 100; 25 MG/1; MG/1
1 TABLET ORAL DAILY
Qty: 90 TABLET | Refills: 0 | Status: SHIPPED | OUTPATIENT
Start: 2022-07-16 | End: 2022-10-23

## 2022-07-16 NOTE — LETTER
7/18/2022 2:07 PM    Ms. Wilian Shah [id number]      Dear Ms. Mendosa:    We've missed you! Please call our office at [phone] and schedule a follow up appointment for your continued care. I will be unable to continue refilling your medication until you have been seen for an appointment. Look forward to seeing you soon.          Sincerely,      Liudmila Conroy MD

## 2022-10-08 DIAGNOSIS — I10 ESSENTIAL HYPERTENSION WITH GOAL BLOOD PRESSURE LESS THAN 140/90: ICD-10-CM

## 2022-10-11 RX ORDER — AMLODIPINE BESYLATE 5 MG/1
5 TABLET ORAL DAILY
Qty: 30 TABLET | Refills: 0 | Status: SHIPPED | OUTPATIENT
Start: 2022-10-11

## 2022-10-23 RX ORDER — ATENOLOL AND CHLORTHALIDONE TABLET 100; 25 MG/1; MG/1
1 TABLET ORAL DAILY
Qty: 30 TABLET | Refills: 0 | Status: SHIPPED | OUTPATIENT
Start: 2022-10-23

## 2022-10-31 ENCOUNTER — OFFICE VISIT (OUTPATIENT)
Dept: FAMILY MEDICINE CLINIC | Age: 62
End: 2022-10-31
Payer: COMMERCIAL

## 2022-10-31 VITALS
RESPIRATION RATE: 18 BRPM | HEART RATE: 58 BPM | WEIGHT: 228 LBS | OXYGEN SATURATION: 100 % | BODY MASS INDEX: 40.4 KG/M2 | DIASTOLIC BLOOD PRESSURE: 73 MMHG | TEMPERATURE: 97.2 F | HEIGHT: 63 IN | SYSTOLIC BLOOD PRESSURE: 125 MMHG

## 2022-10-31 DIAGNOSIS — Z23 ENCOUNTER FOR IMMUNIZATION: ICD-10-CM

## 2022-10-31 DIAGNOSIS — Z72.0 TOBACCO ABUSE: ICD-10-CM

## 2022-10-31 DIAGNOSIS — M25.512 CHRONIC LEFT SHOULDER PAIN: ICD-10-CM

## 2022-10-31 DIAGNOSIS — G89.29 CHRONIC LEFT SHOULDER PAIN: ICD-10-CM

## 2022-10-31 DIAGNOSIS — I10 ESSENTIAL HYPERTENSION WITH GOAL BLOOD PRESSURE LESS THAN 140/90: Primary | ICD-10-CM

## 2022-10-31 DIAGNOSIS — E78.00 HYPERCHOLESTEROLEMIA: ICD-10-CM

## 2022-10-31 PROCEDURE — 3074F SYST BP LT 130 MM HG: CPT | Performed by: FAMILY MEDICINE

## 2022-10-31 PROCEDURE — 99214 OFFICE O/P EST MOD 30 MIN: CPT | Performed by: FAMILY MEDICINE

## 2022-10-31 PROCEDURE — 3078F DIAST BP <80 MM HG: CPT | Performed by: FAMILY MEDICINE

## 2022-10-31 RX ORDER — TETANUS TOXOID, REDUCED DIPHTHERIA TOXOID AND ACELLULAR PERTUSSIS VACCINE, ADSORBED 5; 2.5; 8; 8; 2.5 [IU]/.5ML; [IU]/.5ML; UG/.5ML; UG/.5ML; UG/.5ML
0.5 SUSPENSION INTRAMUSCULAR ONCE
Qty: 0.5 ML | Refills: 0 | Status: SHIPPED | OUTPATIENT
Start: 2022-10-31 | End: 2022-10-31

## 2022-10-31 RX ORDER — ZOSTER VACCINE RECOMBINANT, ADJUVANTED 50 MCG/0.5
KIT INTRAMUSCULAR
Qty: 0.5 ML | Refills: 1 | Status: SHIPPED | OUTPATIENT
Start: 2022-10-31

## 2022-10-31 NOTE — PROGRESS NOTES
HISTORY OF PRESENT ILLNESS  Stephanie Yan is a 65 y.o. female. Patient presents with:  Medication Refill  Sprain: Discuss rt shoulder muscle strain-if this is going to be permanent      Stephanie Yan is here to follow up on their HTN. She also needs to follow up on her lipids. She has been having trouble with her left shoulder for years. 400 mg of Motrin helps. The pain sometimes radiates in the forearm. It is problematic about once a month. Also, she is ready to quit smoking. Review of Systems   Constitutional:  Negative for chills and fever. Eyes:  Negative for blurred vision. Respiratory:  Negative for shortness of breath. Cardiovascular:  Negative for chest pain. Musculoskeletal:  Positive for neck pain. Neurological:  Negative for dizziness, tingling, sensory change, speech change, focal weakness and headaches. Tingling over the deltoid when if flares up     Visit Vitals  /73   Pulse (!) 58   Temp 97.2 °F (36.2 °C) (Temporal)   Resp 18   Ht 5' 3\" (1.6 m)   Wt 228 lb (103.4 kg)   SpO2 100%   BMI 40.39 kg/m²     Physical Exam  Vitals and nursing note reviewed. Constitutional:       General: She is not in acute distress. Appearance: She is well-developed. She is not diaphoretic. Cardiovascular:      Rate and Rhythm: Normal rate and regular rhythm. Heart sounds: Normal heart sounds. No murmur heard. No friction rub. No gallop. Pulmonary:      Effort: Pulmonary effort is normal. No respiratory distress. Breath sounds: Normal breath sounds. No wheezing or rales. Musculoskeletal:      Right shoulder: Normal range of motion. Left shoulder: No tenderness. Normal range of motion. Left upper arm: Normal.      Cervical back: No swelling, deformity, spasms, tenderness or bony tenderness. Normal range of motion. Skin:     General: Skin is warm and dry. Neurological:      Mental Status: She is alert and oriented to person, place, and time. Sensory: Sensation is intact. Motor: No weakness. Deep Tendon Reflexes:      Reflex Scores:       Tricep reflexes are 2+ on the right side and 2+ on the left side. Bicep reflexes are 2+ on the right side and 2+ on the left side. Brachioradialis reflexes are 2+ on the right side and 2+ on the left side. ASSESSMENT and PLAN    ICD-10-CM ICD-9-CM    1. Essential hypertension with goal blood pressure less than 140/90  F49 694.6 METABOLIC PANEL, BASIC      METABOLIC PANEL, BASIC      2. Hypercholesterolemia  E78.00 272.0 LIPID PANEL      HEPATIC FUNCTION PANEL      LIPID PANEL      HEPATIC FUNCTION PANEL      3. Chronic left shoulder pain  P83.127 388.99 METABOLIC PANEL, BASIC    F98.73 338.29 HEPATIC FUNCTION PANEL      METABOLIC PANEL, BASIC      HEPATIC FUNCTION PANEL      4. Tobacco abuse  Z72.0 305.1       5. Encounter for immunization  Z23 V03.89 varicella-zoster recombinant, PF, (Shingrix, PF,) 50 mcg/0.5 mL susr injection      diphth,pertus,acell,,tetanus (Boostrix Tdap) 2.5-8-5 Lf-mcg-Lf/0.5mL susp suspension      pneumococcal 20-molly conj-dip, PF, (PREVNAR 20) 0.5 mL syrg injection          Blood pressure controlled  Shoulder pain intermittent, normal exam today  Labs per orders. Continue current plans. Continue Motrin prn  Heat prn  Shingrix, TDAP, Prevnar 20 at pharmacy  Counseled on smoking cessation    Follow-up and Dispositions    Return in about 6 months (around 4/30/2023) for blood pressure. Reviewed plan of care. Patient has provided input and agrees with goals.     ;

## 2022-10-31 NOTE — PROGRESS NOTES
Marcelle Martin is a 65 y.o. female    Chief Complaint   Patient presents with    Medication Refill    Sprain     Discuss rt shoulder muscle strain-if this is going to be permanent       Visit Vitals  /73   Temp 97.2 °F (36.2 °C) (Temporal)   Resp 18   Ht 5' 3\" (1.6 m)   Wt 228 lb (103.4 kg)   BMI 40.39 kg/m²       3 most recent PHQ Screens 10/31/2022   Little interest or pleasure in doing things Not at all   Feeling down, depressed, irritable, or hopeless Not at all   Total Score PHQ 2 0       No flowsheet data found. Abuse Screening Questionnaire 5/5/2021   Do you ever feel afraid of your partner? N   Are you in a relationship with someone who physically or mentally threatens you? N   Is it safe for you to go home? Y       1. Have you been to the ER, urgent care clinic since your last visit? Hospitalized since your last visit? No     2. Have you seen or consulted any other health care providers outside of the [address] [place] [address] since your last visit? Include any pap smears or colon screening.  No

## 2022-10-31 NOTE — PATIENT INSTRUCTIONS
Stopping Smoking: After Your Visit  Your Care Instructions  Cigarette smokers crave the nicotine in cigarettes. Giving it up is much harder than simply changing a habit. Your body has to stop craving the nicotine. It is hard to quit, but you can do it. There are many tools that people use to quit smoking. You may find that combining tools works best for you. There are several steps to quitting. First you get ready to quit. Then you get support to help you. After that, you learn new skills and behaviors to become a nonsmoker. For many people, a necessary step is getting and using medicine. Your doctor will help you set up the plan that best meets your needs. You may want to attend a smoking cessation program to help you quit smoking. When you choose a program, look for one that has proven success. Ask your doctor for ideas. You will greatly increase your chances of success if you take medicine as well as get counseling or join a cessation program.  Some of the changes you feel when you first quit tobacco are uncomfortable. Your body will miss the nicotine at first, and you may feel short-tempered and grumpy. You may have trouble sleeping or concentrating. Medicine can help you deal with these symptoms. You may struggle with changing your smoking habits and rituals. The last step is the tricky one: Be prepared for the smoking urge to continue for a time. This is a lot to deal with, but keep at it. You will feel better. Follow-up care is a key part of your treatment and safety. Be sure to make and go to all appointments, and call your doctor if you are having problems. Its also a good idea to know your test results and keep a list of the medicines you take. How can you care for yourself at home? Ask your family, friends, and coworkers for support. You have a better chance of quitting if you have help and support. Join a support group, such as Nicotine Anonymous, for people who are trying to quit smoking. Consider signing up for a smoking cessation program, such as the American Lung Association's Freedom from Smoking program.   Set a quit date and stick to it. Pick your date carefully so that it is not right in the middle of a big deadline or stressful time. Once you quit, do not even take a puff. Get rid of all ashtrays and lighters after your last cigarette. Clean your house and your clothes so that they do not smell of smoke. Learn how to be a nonsmoker. Think about ways you can avoid those things that make you reach for a cigarette. Avoid situations that put you at greatest risk for smoking. For some people, it is hard to have a drink with friends without smoking. For others, they might skip a coffee break with coworkers who smoke. Change your daily routine. Take a different route to work or eat a meal in a different place. Cut down on stress. Calm yourself or release tension by doing an activity you enjoy, such as reading a book, taking a hot bath, or gardening. Talk to your doctor or pharmacist about nicotine replacement therapy, which replaces the nicotine in your body. You still get nicotine but you do not use tobacco. Nicotine replacement products help you slowly reduce the amount of nicotine you need. These products come in several forms, many of them available over-the-counter:   Nicotine patches   Nicotine gum and lozenges   Nicotine inhaler  Ask your doctor about bupropion (Wellbutrin) or varenicline (Chantix), which are prescription medicines. They do not contain nicotine. They help you by reducing withdrawal symptoms, such as stress and anxiety. Some people find hypnosis, acupuncture, and massage helpful for ending the smoking habit. Eat a healthy diet and get regular exercise. Having healthy habits will help your body move past its craving for nicotine. Be prepared to keep trying. Most people are not successful the first few times they try to quit.  Do not get mad at yourself if you smoke again. Make a list of things you learned and think about when you want to try again, such as next week, next month, or next year. If other medicines and techniques do not seem to help you, your doctor may prescribe a different medicine, such as nortriptyline (Aventyl, Pamelor) or clonidine (Catapres), that usually is used to treat other medical conditions. These medicines also can be effective at helping people quit smoking. Where can you learn more? Go to [employer]  Enter [id number] in the search box to learn more about \"Stopping Smoking: After Your Visit. \"   © 7887-7944 Healthwise, Incorporated. Care instructions adapted under license by [facility] (which disclaims liability or warranty for this information). This care instruction is for use with your licensed healthcare professional. If you have questions about a medical condition or this instruction, always ask your healthcare professional. [address] any warranty or liability for your use of this information. Content Version: 2.2.71473; Last Revised: August 10, 2009              Deciding About Using Medicines To Quit Smoking  What are the medicines you can use? Your doctor may prescribe varenicline (Chantix) or bupropion (Zyban) to help you cope with cravings for tobacco. These medicines are pills that don't contain nicotine. You also can use nicotine replacement products, which do contain nicotine. There are several types of nicotine replacement. Gum and lozenges slowly release nicotine into your mouth. Patches stick to your skin and slowly release nicotine into your bloodstream.   An inhaler has a santos that contains nicotine. It delivers a puff of nicotine vapor into your mouth and throat. What are key points about this decision? Using medicines can double your chances of quitting smoking. They can relieve nicotine craving and withdrawal symptoms.    Getting counseling, along with using medicine, can increase your chances of quitting even more. If you smoke fewer than 10 cigarettes a day, you may not need medicines to help you quit smoking. The side effects of nicotine replacement products depend on the type of product. For example, a patch can make your skin red and itchy. Medicines in pill form can cause nausea, dry mouth, and trouble sleeping. For most people, the side effects are not bad enough to make them stop using the medicines. Nicotine medicines have less nicotine than cigarettes. And by itself, nicotine is not nearly as harmful as smoking. The tars, carbon monoxide, and other toxic chemicals in tobacco cause the harmful effects. Why might you choose to use medicines to quit smoking? You have tried on your own to stop smoking, but you were not able to stop. You smoke more than 10 cigarettes a day. You want to increase your chances of quitting smoking. You want to reduce your craving for tobacco and your nicotine withdrawal symptoms. You feel the benefits of medicine outweigh the side effects. Why might you choose not to use medicine? You want to try quitting on your own by stopping all at once (\"cold turkey\"). You want to cut back slowly on the number of cigarettes you smoke. You smoke fewer than 10 cigarettes a day. You do not like using medicine. You feel the side effects of medicines outweigh the benefits. You are worried about the cost of medicines. Your decision  Thinking about the facts and your feelings can help you make a decision that is right for you. Be sure you understand the benefits and risks of your options, and think about what else you need to do before you make the decision. Where can you learn more? Go to [employer]  Enter Y030 in the search box to learn more about \"Deciding About Using Medicines To Quit Smoking. \"   © 5614-5078 Healthwise, Incorporated.  Care instructions adapted under license by [facility] (which disclaims liability or warranty for this information). This care instruction is for use with your licensed healthcare professional. If you have questions about a medical condition or this instruction, always ask your healthcare professional. [address] any warranty or liability for your use of this information.   Content Version: 9.1.94732; Last Revised: January 6, 2010

## 2022-11-08 DIAGNOSIS — I10 ESSENTIAL HYPERTENSION WITH GOAL BLOOD PRESSURE LESS THAN 140/90: ICD-10-CM

## 2022-11-09 RX ORDER — AMLODIPINE BESYLATE 5 MG/1
5 TABLET ORAL DAILY
Qty: 90 TABLET | Refills: 3 | Status: SHIPPED | OUTPATIENT
Start: 2022-11-09

## 2022-11-21 RX ORDER — ATENOLOL AND CHLORTHALIDONE TABLET 100; 25 MG/1; MG/1
1 TABLET ORAL DAILY
Qty: 30 TABLET | Refills: 0 | Status: SHIPPED | OUTPATIENT
Start: 2022-11-21

## 2022-12-22 ENCOUNTER — VIRTUAL VISIT (OUTPATIENT)
Dept: FAMILY MEDICINE CLINIC | Age: 62
End: 2022-12-22
Payer: COMMERCIAL

## 2022-12-22 DIAGNOSIS — E78.00 HYPERCHOLESTEROLEMIA: Primary | ICD-10-CM

## 2022-12-22 DIAGNOSIS — Z72.0 TOBACCO ABUSE: ICD-10-CM

## 2022-12-22 RX ORDER — ROSUVASTATIN CALCIUM 5 MG/1
5 TABLET, COATED ORAL
Qty: 90 TABLET | Refills: 0 | Status: SHIPPED | OUTPATIENT
Start: 2022-12-22

## 2022-12-22 NOTE — LETTER
12/22/2022 3:22 PM    Ms. Chaparro Aldrich  [address] [facility] [address]    Dear Chaparro Aldrich: It was nice talking with you. Here is the heart healthy diet I told you about:      Heart-Healthy Diet: After Your Visit  Your Care Instructions     A heart-healthy diet has lots of vegetables, fruits, nuts, beans, and whole grains, and is low in salt. It limits foods that are high in saturated fat, such as meats, cheeses, and fried foods. It may be hard to change your diet, but even small changes can lower your risk of heart attack and heart disease. Follow-up care is a key part of your treatment and safety. Be sure to make and go to all appointments, and call your doctor if you are having problems. It's also a good idea to know your test results and keep a list of the medicines you take. How can you care for yourself at home? Watch your portions  · Learn what a serving is. A \"serving\" and a \"portion\" are not always the same thing. Make sure that you are not eating larger portions than are recommended. For example, a serving of pasta is ½ cup. A serving size of meat is 2 to 3 ounces. A 3-ounce serving is about the size of a deck of cards. Measure serving sizes until you are good at [place]" them. Keep in mind that restaurants often serve portions that are 2 or 3 times the size of one serving. · To keep your energy level up and keep you from feeling hungry, eat often but in smaller portions. · Eat only the number of calories you need to stay at a healthy weight. If you need to lose weight, eat fewer calories than your body burns (through exercise and other physical activity). Eat more fruits and vegetables  · Eat a variety of fruit and vegetables every day. Dark green, deep orange, red, or yellow fruits and vegetables are especially good for you. Examples include spinach, carrots, peaches, and berries. · Keep carrots, celery, and other veggies handy for snacks.  Buy fruit that is in season and store it where you can see it so that you will be tempted to eat it. · Cook dishes that have a lot of veggies in them, such as stir-fries and soups. Limit saturated and trans fat  · Read food labels, and try to avoid saturated and trans fats. They increase your risk of heart disease. Trans fat is found in many processed foods such as cookies and crackers. · Use olive or canola oil when you cook. Try cholesterol-lowering spreads, such as Benecol or Take Control. · Bake, broil, grill, or steam foods instead of frying them. · Choose lean meats instead of high-fat meats such as hot dogs and sausages. Cut off all visible fat when you prepare meat. · Eat fish, skinless poultry, and meat alternatives such as soy products instead of high-fat meats. Soy products, such as tofu, may be especially good for your heart. · Choose low-fat or fat-free milk and dairy products. Eat fish  · Eat at least two servings of fish a week. Certain fish, such as salmon and tuna, contain omega-3 fatty acids, which may help reduce your risk of heart attack. Eat foods high in fiber  · Eat a variety of grain products every day. Include whole-grain foods that have lots of fiber and nutrients. Examples of whole-grain foods include oats, whole wheat bread, and brown rice. · Buy whole-grain breads and cereals, instead of white bread or pastries. Limit salt and sodium  · Limit how much salt and sodium you eat to help lower your blood pressure. · Taste food before you salt it. Add only a little salt when you think you need it. With time, your taste buds will adjust to less salt. · Eat fewer snack items, fast foods, and other high-salt, processed foods. Check food labels for the amount of sodium in packaged foods. · Choose low-sodium versions of canned goods (such as soups, vegetables, and beans). Limit sugar  · Limit drinks and foods with added sugar. These include candy, desserts, and soda pop.   Limit alcohol  · Limit alcohol to no more than 2 drinks a day for men and 1 drink a day for women. Too much alcohol can cause health problems. When should you call for help? Watch closely for changes in your health, and be sure to contact your doctor if:  · You would like help planning heart-healthy meals. Where can you learn more? Go to [employer]  Enter V137 in the search box to learn more about \"Heart-Healthy Diet: After Your Visit. \"   © 0938-6063 Healthwise, Incorporated. Care instructions adapted under license by [facility] (which disclaims liability or warranty for this information). This care instruction is for use with your licensed healthcare professional. If you have questions about a medical condition or this instruction, always ask your healthcare professional. [address] any warranty or liability for your use of this information. Content Version: 93.0.479454; Current as of: March 12, 2014              [employer] AND THE [facility]    Best of luck!       Sincerely,      Olya Bowser MD

## 2022-12-22 NOTE — PROGRESS NOTES
Mingo Bentley is a 65 y.o. female who was seen by synchronous (real-time) audio-video technology on 12/22/2022. Assessment & Plan:   Diagnoses and all orders for this visit:    1. Hypercholesterolemia  -     LIPID PANEL; Future  -     HEPATIC FUNCTION PANEL; Future  -     rosuvastatin (Crestor) 5 mg tablet; Take 1 Tablet by mouth nightly. 2. Tobacco abuse      LDL quite elevated, risk factors for CAD  Start Crestor with lipid labs in 3 months  Heart healthy diet, regular exercise, weight loss  Counseled to stop smoking    Follow-up and Dispositions    Return if symptoms worsen or fail to improve. Reviewed plan of care. Patient has provided input and agrees with goals. CPT Codes 83062-05082 for Established Patients may apply to this Telehealth Visit      Subjective:   Mingo Bentley was seen for Labs (Review.)      Patient presents with:  Labs: Review. Her LDL was 186. She has controlled HTN, she smokes, but she is not diabetic. Review of Systems   Respiratory:  Negative for shortness of breath. Cardiovascular:  Negative for chest pain and leg swelling. Objective:     Physical Exam  Constitutional:       General: She is not in acute distress. Appearance: Normal appearance. Neurological:      Mental Status: She is alert and oriented to person, place, and time. Due to this being a TeleHealth evaluation, many elements of the physical examination are unable to be assessed. We discussed the expected course, resolution and complications of the diagnosis(es) in detail. Medication risks, benefits, costs, interactions, and alternatives were discussed as indicated. I advised her to contact the office if her condition worsens, changes or fails to improve as anticipated. She expressed understanding with the diagnosis(es) and plan.          Pursuant to the emergency declaration under the [facility] [place], [address] waiver authority and the [employer] Act, this Virtual  Visit was conducted, with patient's consent, to reduce the patient's risk of exposure to COVID-19 and provide continuity of care for an established patient. Services were provided through a video synchronous discussion virtually to substitute for in-person clinic visit.     Jose Hoskins MD

## 2022-12-22 NOTE — PROGRESS NOTES
Chief Complaint   Patient presents with    Labs     Review. 1. Have you been to the ER, urgent care clinic since your last visit? Hospitalized since your last visit? No    2. Have you seen or consulted any other health care providers outside of the [address] [place] [address] since your last visit? Include any pap smears or colon screening.  No

## 2022-12-28 RX ORDER — ATENOLOL AND CHLORTHALIDONE TABLET 100; 25 MG/1; MG/1
1 TABLET ORAL DAILY
Qty: 90 TABLET | Refills: 3 | Status: SHIPPED | OUTPATIENT
Start: 2022-12-28

## 2023-02-21 DIAGNOSIS — E78.00 HYPERCHOLESTEROLEMIA: ICD-10-CM

## 2023-02-21 RX ORDER — ROSUVASTATIN CALCIUM 5 MG/1
TABLET, COATED ORAL
Qty: 90 TABLET | Refills: 0 | Status: SHIPPED | OUTPATIENT
Start: 2023-02-21

## 2023-02-22 NOTE — TELEPHONE ENCOUNTER
Called and spoke with pt and  she has been advised and states understanding of this and agrees with plan.

## 2023-03-02 ENCOUNTER — TELEPHONE (OUTPATIENT)
Dept: FAMILY MEDICINE CLINIC | Age: 63
End: 2023-03-02

## 2023-03-02 NOTE — TELEPHONE ENCOUNTER
[facility] and wants to know if she does her labs will the steroids effect her numbers at all. She will be done with them in 2 more days.    Please call her [phone]

## 2023-03-24 LAB
ALBUMIN SERPL-MCNC: 4.2 G/DL (ref 3.8–4.8)
ALP SERPL-CCNC: 61 IU/L (ref 44–121)
ALT SERPL-CCNC: 27 IU/L (ref 0–32)
AST SERPL-CCNC: 24 IU/L (ref 0–40)
BILIRUB DIRECT SERPL-MCNC: <0.1 MG/DL (ref 0–0.4)
BILIRUB SERPL-MCNC: 0.3 MG/DL (ref 0–1.2)
CHOLEST SERPL-MCNC: 145 MG/DL (ref 100–199)
HDLC SERPL-MCNC: 39 MG/DL
IMP & REVIEW OF LAB RESULTS: NORMAL
LDLC SERPL CALC-MCNC: 83 MG/DL (ref 0–99)
PROT SERPL-MCNC: 6.6 G/DL (ref 6–8.5)
TRIGL SERPL-MCNC: 126 MG/DL (ref 0–149)
VLDLC SERPL CALC-MCNC: 23 MG/DL (ref 5–40)

## 2023-05-25 ENCOUNTER — HOSPITAL ENCOUNTER (OUTPATIENT)
Facility: HOSPITAL | Age: 63
Discharge: HOME | End: 2023-05-25
Payer: COMMERCIAL

## 2023-05-25 DIAGNOSIS — Z12.31 SCREENING MAMMOGRAM FOR HIGH-RISK PATIENT: ICD-10-CM

## 2023-05-25 PROCEDURE — 77067 SCR MAMMO BI INCL CAD: CPT

## 2023-06-02 RX ORDER — ROSUVASTATIN CALCIUM 5 MG/1
TABLET, COATED ORAL
Qty: 90 TABLET | Refills: 3 | Status: SHIPPED | OUTPATIENT
Start: 2023-06-02

## 2023-06-06 ENCOUNTER — OFFICE VISIT (OUTPATIENT)
Facility: CLINIC | Age: 63
End: 2023-06-06
Payer: COMMERCIAL

## 2023-06-06 VITALS
DIASTOLIC BLOOD PRESSURE: 76 MMHG | TEMPERATURE: 98 F | HEIGHT: 63 IN | RESPIRATION RATE: 20 BRPM | OXYGEN SATURATION: 96 % | SYSTOLIC BLOOD PRESSURE: 130 MMHG | WEIGHT: 241 LBS | BODY MASS INDEX: 42.7 KG/M2 | HEART RATE: 64 BPM

## 2023-06-06 DIAGNOSIS — E78.00 PURE HYPERCHOLESTEROLEMIA, UNSPECIFIED: ICD-10-CM

## 2023-06-06 DIAGNOSIS — H69.82 DYSFUNCTION OF LEFT EUSTACHIAN TUBE: Primary | ICD-10-CM

## 2023-06-06 DIAGNOSIS — I10 ESSENTIAL HYPERTENSION WITH GOAL BLOOD PRESSURE LESS THAN 140/90: ICD-10-CM

## 2023-06-06 DIAGNOSIS — E66.01 OBESITY, CLASS III, BMI 40-49.9 (MORBID OBESITY) (HCC): ICD-10-CM

## 2023-06-06 PROCEDURE — 99214 OFFICE O/P EST MOD 30 MIN: CPT | Performed by: FAMILY MEDICINE

## 2023-06-06 PROCEDURE — 3075F SYST BP GE 130 - 139MM HG: CPT | Performed by: FAMILY MEDICINE

## 2023-06-06 PROCEDURE — 3078F DIAST BP <80 MM HG: CPT | Performed by: FAMILY MEDICINE

## 2023-06-06 RX ORDER — FLUTICASONE PROPIONATE 50 MCG
2 SPRAY, SUSPENSION (ML) NASAL DAILY
Qty: 16 G | Refills: 3 | Status: SHIPPED | OUTPATIENT
Start: 2023-06-06

## 2023-06-06 ASSESSMENT — PATIENT HEALTH QUESTIONNAIRE - PHQ9
SUM OF ALL RESPONSES TO PHQ QUESTIONS 1-9: 0
SUM OF ALL RESPONSES TO PHQ QUESTIONS 1-9: 0
1. LITTLE INTEREST OR PLEASURE IN DOING THINGS: 0
SUM OF ALL RESPONSES TO PHQ QUESTIONS 1-9: 0
SUM OF ALL RESPONSES TO PHQ QUESTIONS 1-9: 0
2. FEELING DOWN, DEPRESSED OR HOPELESS: 0
SUM OF ALL RESPONSES TO PHQ9 QUESTIONS 1 & 2: 0

## 2023-06-06 NOTE — PROGRESS NOTES
Chief Complaint   Patient presents with    Otalgia     Left ear x3 days - has improved some
[facility]  06/06/23 4:32 PM    Portions of this note may have been populated using smart dictation software and may have \"sounds-like\" errors present. Pt was counseled on risks, benefits and alternatives of treatment options. All questions were asked and answered and the patient was agreeable with the treatment plan as outlined.

## 2023-11-16 ENCOUNTER — TELEPHONE (OUTPATIENT)
Facility: CLINIC | Age: 63
End: 2023-11-16

## 2023-11-16 NOTE — TELEPHONE ENCOUNTER
Called pt and she expressed concerns about changing PCP's for her and for her daughter, Char Maurer, especially with Sissy's medical needs and health history, prefers to remain within [facility]. Advised of Dr. Jessy Cobb at [facility] office if unable to wait to establish care here with Dr. Trevon Patel. Ms. Jennifer Armando expressed understanding of Dr. Adrianna Lam health condition being important and wants her to get the care she needs as well. Pt asks that we contact her if we get a new physician at our practice.  Giuseppe

## 2023-11-16 NOTE — TELEPHONE ENCOUNTER
----- Message from Jackson Ceballos sent at 11/16/2023  4:01 PM EST -----  Subject: Message to Provider    QUESTIONS  Information for Provider? Pt would like [facility] to give her a call back. I am   unable to contact the office at this time for pt. Pt states she just   wanted to ask her a question so i am unable to retrieve any more   information.  ---------------------------------------------------------------------------  --------------  Xochitl BRYANT  [phone]; OK to leave message on voicemail  ---------------------------------------------------------------------------  --------------  SCRIPT ANSWERS  Relationship to Patient?  Self

## 2024-01-08 RX ORDER — AMLODIPINE BESYLATE 5 MG/1
5 TABLET ORAL DAILY
Qty: 90 TABLET | Refills: 1 | Status: SHIPPED | OUTPATIENT
Start: 2024-01-08

## 2024-03-01 RX ORDER — ATENOLOL AND CHLORTHALIDONE TABLET 100; 25 MG/1; MG/1
1 TABLET ORAL DAILY
Qty: 90 TABLET | OUTPATIENT
Start: 2024-03-01

## 2024-04-24 ENCOUNTER — HOSPITAL ENCOUNTER (OUTPATIENT)
Facility: HOSPITAL | Age: 64
Discharge: HOME | End: 2024-04-27
Payer: COMMERCIAL

## 2024-04-24 ENCOUNTER — OFFICE VISIT (OUTPATIENT)
Facility: CLINIC | Age: 64
End: 2024-04-24
Payer: COMMERCIAL

## 2024-04-24 VITALS
SYSTOLIC BLOOD PRESSURE: 110 MMHG | HEART RATE: 72 BPM | TEMPERATURE: 97.3 F | HEIGHT: 63 IN | OXYGEN SATURATION: 97 % | DIASTOLIC BLOOD PRESSURE: 62 MMHG | WEIGHT: 233.8 LBS | BODY MASS INDEX: 41.43 KG/M2

## 2024-04-24 DIAGNOSIS — G89.29 CHRONIC PAIN OF LEFT KNEE: ICD-10-CM

## 2024-04-24 DIAGNOSIS — M25.562 CHRONIC PAIN OF LEFT KNEE: ICD-10-CM

## 2024-04-24 DIAGNOSIS — R73.03 PREDIABETES: ICD-10-CM

## 2024-04-24 DIAGNOSIS — J45.20 MILD INTERMITTENT EXTRINSIC ASTHMA WITHOUT COMPLICATION: ICD-10-CM

## 2024-04-24 DIAGNOSIS — Z00.00 ENCOUNTER FOR MEDICAL EXAMINATION TO ESTABLISH CARE: Primary | ICD-10-CM

## 2024-04-24 DIAGNOSIS — I10 PRIMARY HYPERTENSION: ICD-10-CM

## 2024-04-24 DIAGNOSIS — E78.00 HYPERCHOLESTEROLEMIA: ICD-10-CM

## 2024-04-24 DIAGNOSIS — F17.200 TOBACCO DEPENDENCE: ICD-10-CM

## 2024-04-24 DIAGNOSIS — E78.00 HYPERCHOLESTEROLEMIA: Primary | ICD-10-CM

## 2024-04-24 DIAGNOSIS — E66.01 OBESITY, CLASS III, BMI 40-49.9 (MORBID OBESITY) (HCC): ICD-10-CM

## 2024-04-24 PROCEDURE — 73562 X-RAY EXAM OF KNEE 3: CPT

## 2024-04-24 PROCEDURE — 3074F SYST BP LT 130 MM HG: CPT | Performed by: FAMILY MEDICINE

## 2024-04-24 PROCEDURE — 99214 OFFICE O/P EST MOD 30 MIN: CPT | Performed by: FAMILY MEDICINE

## 2024-04-24 PROCEDURE — 3078F DIAST BP <80 MM HG: CPT | Performed by: FAMILY MEDICINE

## 2024-04-24 RX ORDER — NICOTINE 21 MG/24HR
1 PATCH, TRANSDERMAL 24 HOURS TRANSDERMAL EVERY 24 HOURS
Qty: 42 PATCH | Refills: 0 | Status: SHIPPED | OUTPATIENT
Start: 2024-04-24 | End: 2025-04-24

## 2024-04-24 RX ORDER — ROSUVASTATIN CALCIUM 5 MG/1
5 TABLET, COATED ORAL NIGHTLY
Qty: 90 TABLET | Refills: 0 | Status: SHIPPED | OUTPATIENT
Start: 2024-04-24

## 2024-04-24 RX ORDER — ALBUTEROL SULFATE 90 UG/1
2 AEROSOL, METERED RESPIRATORY (INHALATION) EVERY 4 HOURS PRN
Qty: 18 G | Refills: 2 | Status: SHIPPED | OUTPATIENT
Start: 2024-04-24

## 2024-04-24 RX ORDER — AMLODIPINE BESYLATE 5 MG/1
5 TABLET ORAL DAILY
Qty: 90 TABLET | Refills: 0 | Status: SHIPPED | OUTPATIENT
Start: 2024-04-24

## 2024-04-24 RX ORDER — NICOTINE 21 MG/24HR
1 PATCH, TRANSDERMAL 24 HOURS TRANSDERMAL EVERY 24 HOURS
Qty: 30 PATCH | Refills: 3 | Status: SHIPPED | OUTPATIENT
Start: 2024-04-24 | End: 2024-04-24 | Stop reason: CLARIF

## 2024-04-24 RX ORDER — ATENOLOL AND CHLORTHALIDONE TABLET 100; 25 MG/1; MG/1
1 TABLET ORAL DAILY
Qty: 90 TABLET | Refills: 0 | Status: SHIPPED | OUTPATIENT
Start: 2024-04-24

## 2024-04-24 SDOH — ECONOMIC STABILITY: FOOD INSECURITY: WITHIN THE PAST 12 MONTHS, YOU WORRIED THAT YOUR FOOD WOULD RUN OUT BEFORE YOU GOT MONEY TO BUY MORE.: NEVER TRUE

## 2024-04-24 SDOH — ECONOMIC STABILITY: HOUSING INSECURITY
IN THE LAST 12 MONTHS, WAS THERE A TIME WHEN YOU DID NOT HAVE A STEADY PLACE TO SLEEP OR SLEPT IN A SHELTER (INCLUDING NOW)?: NO

## 2024-04-24 SDOH — ECONOMIC STABILITY: FOOD INSECURITY: WITHIN THE PAST 12 MONTHS, THE FOOD YOU BOUGHT JUST DIDN'T LAST AND YOU DIDN'T HAVE MONEY TO GET MORE.: NEVER TRUE

## 2024-04-24 SDOH — ECONOMIC STABILITY: INCOME INSECURITY: HOW HARD IS IT FOR YOU TO PAY FOR THE VERY BASICS LIKE FOOD, HOUSING, MEDICAL CARE, AND HEATING?: NOT HARD AT ALL

## 2024-04-24 ASSESSMENT — PATIENT HEALTH QUESTIONNAIRE - PHQ9
SUM OF ALL RESPONSES TO PHQ QUESTIONS 1-9: 0
1. LITTLE INTEREST OR PLEASURE IN DOING THINGS: NOT AT ALL
2. FEELING DOWN, DEPRESSED OR HOPELESS: NOT AT ALL
SUM OF ALL RESPONSES TO PHQ9 QUESTIONS 1 & 2: 0
SUM OF ALL RESPONSES TO PHQ QUESTIONS 1-9: 0

## 2024-04-24 NOTE — PROGRESS NOTES
[facility]      HPI: Pt is a 63 y.o. female who presents for establish care.    HTN: Takes amlodipine, atenolol-chlorthalidone without issues.     HLD: Takes Crestor without issues.    L knee pain: In the back of her knee, happens from time to time for the past few months. Improved with ibuprofen. Worse when she wears heels. She cannot remember a major injury to the knee but reports she has had a lot of falls over the years. She has known OA in the R knee.     Allergies/asthma: She sometimes gets SOB and needs albuterol, generally in the springtime.     Tobacco dependence: She smokes 1/2PPD and is interested in quitting.       Past Medical History:   Diagnosis Date    Chronic left shoulder pain 10/31/2022    Fatty hernia of linea alba 5/9/2012    Fatty liver 4/3/2012    Gallstones 3/29/2012    GERD (gastroesophageal reflux disease)     Hypercholesterolemia 5/11/2012    Hypertension     Hypokalemia 7/17/2013    LE (discoid lupus erythematosus)     LVH (left ventricular hypertrophy)     on echo    Nausea & vomiting     Severe, requests Scopolamine Patch    Tobacco abuse 3/20/2012       Family History   Problem Relation Age of Onset    Hypertension Mother     Heart Disease Mother     Lung Cancer Father     Cancer Maternal Grandfather        Social History     Tobacco Use    Smoking status: Every Day     Current packs/day: 0.25     Types: Cigarettes    Smokeless tobacco: Never   Vaping Use    Vaping Use: Never used   Substance Use Topics    Alcohol use: Yes    Drug use: No       ROS:  Per HPI    PE:  /62 (Site: Left Upper Arm, Position: Sitting)   Pulse 72   Temp 97.3 °F (36.3 °C) (Temporal)   Ht 1.6 m (5' 3\")   Wt 106.1 kg (233 lb 12.8 oz)   SpO2 97%   BMI 41.42 kg/m²   Gen: Pt sitting in chair, in NAD  Head: Normocephalic, atraumatic  Eyes: Sclera anicteric, EOM grossly intact, PERRL  Ears: TM's pearly with good light reflex b/l  Nose: Normal nasal mucosa  Throat: MMM, normal lips, tongue,

## 2024-04-24 NOTE — PROGRESS NOTES
Chief Complaint   Patient presents with    New Patient    Medication Refill    Knee Pain       \"Have you been to the ER, urgent care clinic since your last visit?  Hospitalized since your last visit?\"    NO    “Have you seen or consulted any other health care providers outside of [facility] since your last visit?”    NO        “Have you had a colorectal cancer screening such as a colonoscopy/FIT/Cologuard?    NO    No colonoscopy on file  Date of last Cologuard: 8/31/2020  No FIT/FOBT on file   No flexible sigmoidoscopy on file         Click Here for Release of Records Request    PHQ-9 Total Score: 0 (4/24/2024  2:43 PM)

## 2024-04-25 PROBLEM — E11.9 TYPE 2 DIABETES MELLITUS WITHOUT COMPLICATION (HCC): Status: ACTIVE | Noted: 2024-04-25

## 2024-04-25 LAB
ALBUMIN SERPL-MCNC: 4.3 G/DL (ref 3.9–4.9)
ALBUMIN/GLOB SERPL: 1.9 {RATIO} (ref 1.2–2.2)
ALP SERPL-CCNC: 63 IU/L (ref 44–121)
ALT SERPL-CCNC: 23 IU/L (ref 0–32)
AST SERPL-CCNC: 24 IU/L (ref 0–40)
BILIRUB SERPL-MCNC: 0.3 MG/DL (ref 0–1.2)
BUN SERPL-MCNC: 12 MG/DL (ref 8–27)
BUN/CREAT SERPL: 17 (ref 12–28)
CALCIUM SERPL-MCNC: 9.5 MG/DL (ref 8.7–10.3)
CHLORIDE SERPL-SCNC: 102 MMOL/L (ref 96–106)
CHOLEST SERPL-MCNC: 134 MG/DL (ref 100–199)
CO2 SERPL-SCNC: 27 MMOL/L (ref 20–29)
CREAT SERPL-MCNC: 0.69 MG/DL (ref 0.57–1)
EGFRCR SERPLBLD CKD-EPI 2021: 98 ML/MIN/1.73
GLOBULIN SER CALC-MCNC: 2.3 G/DL (ref 1.5–4.5)
GLUCOSE SERPL-MCNC: 127 MG/DL (ref 70–99)
HBA1C MFR BLD: 6.7 % (ref 4.8–5.6)
HDLC SERPL-MCNC: 36 MG/DL
LDLC SERPL CALC-MCNC: 66 MG/DL (ref 0–99)
POTASSIUM SERPL-SCNC: 3.7 MMOL/L (ref 3.5–5.2)
PROT SERPL-MCNC: 6.6 G/DL (ref 6–8.5)
SODIUM SERPL-SCNC: 142 MMOL/L (ref 134–144)
TRIGL SERPL-MCNC: 191 MG/DL (ref 0–149)
VLDLC SERPL CALC-MCNC: 32 MG/DL (ref 5–40)

## 2024-05-31 ENCOUNTER — TELEPHONE (OUTPATIENT)
Facility: CLINIC | Age: 64
End: 2024-05-31

## 2024-05-31 NOTE — TELEPHONE ENCOUNTER
Called patient and got her scheduled. Patient would also like to know what she could do for the time being until the appointment. 1633 5/31/24

## 2024-05-31 NOTE — TELEPHONE ENCOUNTER
Patient is having knee pain and swelling.  Scheduled for 9/2024 for knee injection here in office.

## 2024-06-05 NOTE — TELEPHONE ENCOUNTER
Lmtcb to attempt to get scheduled for an earlier appointment. - pep   Third attempt, message closed.

## 2024-06-10 ENCOUNTER — TELEPHONE (OUTPATIENT)
Facility: CLINIC | Age: 64
End: 2024-06-10

## 2024-06-10 NOTE — TELEPHONE ENCOUNTER
Patient was sent through [facility] for nurse triage. She stated her knee swelling is a lot worse and it is getting very hard to manage at this point. She would like to know if there is anything MWE or a clinical staff can recommend. Please call patient at [phone].

## 2024-06-11 NOTE — TELEPHONE ENCOUNTER
We could offer her the soonest available appt for a knee injection or we could put in a referral to Ortho and/or PT. I'm happy to do whichever option she prefers.     Thanks!

## 2024-06-11 NOTE — TELEPHONE ENCOUNTER
Spoke to patient and advised of all.  Also advised patient of ortho-on-call.  She stated that she would check them out and would give our office a call if she no longer needed to appointment on 7/1/24.

## 2024-07-24 DIAGNOSIS — I10 PRIMARY HYPERTENSION: ICD-10-CM

## 2024-07-24 DIAGNOSIS — E78.00 HYPERCHOLESTEROLEMIA: ICD-10-CM

## 2024-07-24 RX ORDER — AMLODIPINE BESYLATE 5 MG/1
5 TABLET ORAL DAILY
Qty: 90 TABLET | Refills: 0 | Status: SHIPPED | OUTPATIENT
Start: 2024-07-24 | End: 2024-07-24 | Stop reason: SDUPTHER

## 2024-07-24 RX ORDER — ROSUVASTATIN CALCIUM 5 MG/1
5 TABLET, COATED ORAL NIGHTLY
Qty: 90 TABLET | Refills: 1 | Status: SHIPPED | OUTPATIENT
Start: 2024-07-24

## 2024-07-24 RX ORDER — AMLODIPINE BESYLATE 5 MG/1
5 TABLET ORAL DAILY
Qty: 90 TABLET | Refills: 1 | Status: SHIPPED | OUTPATIENT
Start: 2024-07-24

## 2024-07-24 RX ORDER — ROSUVASTATIN CALCIUM 5 MG/1
5 TABLET, COATED ORAL NIGHTLY
Qty: 90 TABLET | Refills: 0 | Status: SHIPPED | OUTPATIENT
Start: 2024-07-24 | End: 2024-07-24 | Stop reason: SDUPTHER

## 2024-08-10 DIAGNOSIS — I10 PRIMARY HYPERTENSION: ICD-10-CM

## 2024-08-12 RX ORDER — ATENOLOL AND CHLORTHALIDONE TABLET 100; 25 MG/1; MG/1
1 TABLET ORAL DAILY
Qty: 90 TABLET | Refills: 0 | Status: SHIPPED | OUTPATIENT
Start: 2024-08-12

## 2024-09-05 ENCOUNTER — TELEPHONE (OUTPATIENT)
Facility: CLINIC | Age: 64
End: 2024-09-05

## 2024-09-05 NOTE — TELEPHONE ENCOUNTER
Patient is scheduled Monday for a Joint injection. This was made back in May and according to notes she had an appt with us in July but cancelled due to going to [facility] On [facility]. Can we verify if she is still having knee pain and needs to be seen?

## 2024-09-05 NOTE — TELEPHONE ENCOUNTER
Spoke to patient and she no longer needed the appt for Monday she said that her knee feel much better.

## 2024-10-09 DIAGNOSIS — E78.00 HYPERCHOLESTEROLEMIA: ICD-10-CM

## 2024-10-09 RX ORDER — ROSUVASTATIN CALCIUM 5 MG/1
5 TABLET, COATED ORAL NIGHTLY
Qty: 90 TABLET | Refills: 0 | Status: SHIPPED | OUTPATIENT
Start: 2024-10-09

## 2024-10-10 ENCOUNTER — PATIENT MESSAGE (OUTPATIENT)
Facility: CLINIC | Age: 64
End: 2024-10-10

## 2025-01-11 DIAGNOSIS — I10 PRIMARY HYPERTENSION: ICD-10-CM

## 2025-01-13 RX ORDER — ATENOLOL AND CHLORTHALIDONE TABLET 100; 25 MG/1; MG/1
1 TABLET ORAL DAILY
Qty: 90 TABLET | Refills: 0 | Status: SHIPPED | OUTPATIENT
Start: 2025-01-13

## 2025-04-21 DIAGNOSIS — I10 PRIMARY HYPERTENSION: ICD-10-CM

## 2025-04-21 RX ORDER — ATENOLOL AND CHLORTHALIDONE TABLET 100; 25 MG/1; MG/1
1 TABLET ORAL DAILY
Qty: 90 TABLET | Refills: 0 | Status: SHIPPED | OUTPATIENT
Start: 2025-04-21

## 2025-04-21 NOTE — TELEPHONE ENCOUNTER
Future Appointments  4/21/2025 - 4/21/2027   Date Visit Type Department Provider    5/9/2025 10:30 AM PHYSICAL [facility] Zee Paz, WANDY - CNP   Appointment Notes:   physical

## 2025-05-03 DIAGNOSIS — I10 PRIMARY HYPERTENSION: ICD-10-CM

## 2025-05-05 RX ORDER — ATENOLOL AND CHLORTHALIDONE TABLET 100; 25 MG/1; MG/1
1 TABLET ORAL DAILY
Qty: 90 TABLET | Refills: 0 | OUTPATIENT
Start: 2025-05-05

## 2025-05-09 ENCOUNTER — OFFICE VISIT (OUTPATIENT)
Facility: CLINIC | Age: 65
End: 2025-05-09

## 2025-05-09 VITALS
TEMPERATURE: 97.2 F | WEIGHT: 223 LBS | BODY MASS INDEX: 39.51 KG/M2 | HEIGHT: 63 IN | OXYGEN SATURATION: 97 % | DIASTOLIC BLOOD PRESSURE: 74 MMHG | HEART RATE: 62 BPM | SYSTOLIC BLOOD PRESSURE: 128 MMHG | RESPIRATION RATE: 16 BRPM

## 2025-05-09 DIAGNOSIS — Z12.11 SCREENING FOR COLON CANCER: ICD-10-CM

## 2025-05-09 DIAGNOSIS — Z12.31 ENCOUNTER FOR SCREENING MAMMOGRAM FOR MALIGNANT NEOPLASM OF BREAST: ICD-10-CM

## 2025-05-09 DIAGNOSIS — I10 ESSENTIAL HYPERTENSION WITH GOAL BLOOD PRESSURE LESS THAN 140/90: ICD-10-CM

## 2025-05-09 DIAGNOSIS — E11.9 TYPE 2 DIABETES MELLITUS WITHOUT COMPLICATION, WITHOUT LONG-TERM CURRENT USE OF INSULIN (HCC): ICD-10-CM

## 2025-05-09 DIAGNOSIS — E78.00 HYPERCHOLESTEROLEMIA: ICD-10-CM

## 2025-05-09 DIAGNOSIS — Z00.00 ENCOUNTER FOR ANNUAL PHYSICAL EXAM: Primary | ICD-10-CM

## 2025-05-09 PROBLEM — E66.9 OBESITY (BMI 30-39.9): Status: ACTIVE | Noted: 2025-05-09

## 2025-05-09 PROBLEM — E66.01 SEVERE OBESITY (HCC): Status: RESOLVED | Noted: 2019-10-17 | Resolved: 2025-05-09

## 2025-05-09 LAB — HBA1C MFR BLD: 6.3 %

## 2025-05-09 RX ORDER — DOXYCYCLINE 100 MG/1
CAPSULE ORAL
COMMUNITY
Start: 2025-05-04

## 2025-05-09 SDOH — ECONOMIC STABILITY: FOOD INSECURITY: WITHIN THE PAST 12 MONTHS, YOU WORRIED THAT YOUR FOOD WOULD RUN OUT BEFORE YOU GOT MONEY TO BUY MORE.: NEVER TRUE

## 2025-05-09 SDOH — ECONOMIC STABILITY: FOOD INSECURITY: WITHIN THE PAST 12 MONTHS, THE FOOD YOU BOUGHT JUST DIDN'T LAST AND YOU DIDN'T HAVE MONEY TO GET MORE.: NEVER TRUE

## 2025-05-09 ASSESSMENT — PATIENT HEALTH QUESTIONNAIRE - PHQ9
SUM OF ALL RESPONSES TO PHQ QUESTIONS 1-9: 0
1. LITTLE INTEREST OR PLEASURE IN DOING THINGS: NOT AT ALL
2. FEELING DOWN, DEPRESSED OR HOPELESS: NOT AT ALL
SUM OF ALL RESPONSES TO PHQ QUESTIONS 1-9: 0

## 2025-05-09 ASSESSMENT — ENCOUNTER SYMPTOMS
CHEST TIGHTNESS: 0
WHEEZING: 0
SHORTNESS OF BREATH: 0
ABDOMINAL PAIN: 0

## 2025-05-09 NOTE — PROGRESS NOTES
Annual Physical/ Chronic Condition Follow-Up    SUBJECTIVE/OBJECTIVE:    Kimmie Bailey is a 65 y.o. female who is on the schedule today for annual wellness exam and to follow up on chronic conditions.     Annual Physical    1. Encounter for annual physical exam  For the wellness:  Flu vaccine- Outside of season  COVID vaccine- Refused. Discussed benefits of vaccination and risks of refusal with patient.   Tetanus- Refused. Discussed benefits of vaccination and risks of refusal with patient.   Shingrix- Refused. Discussed benefits of vaccination and risks of refusal with patient.   Prevnar 20 OR PCV15 then Pneumovax 23 1 yr later- Refused. Discussed benefits of vaccination and risks of refusal with patient.   HIV/HCV screening- HIV not done  PAP/Hx HPV?- Hysterectomy, she is unsure if she has cervix, has not had PAP for several years.  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 Plans to start moving more.   Diet- Patient does eat some salads but also will eat carbs.   Dentist- Overdue, encouraged to return  Eye Exam- Has never had one, encouraged to go  Skin Check- Denies areas of concern  Hearing Exam- Denies changes      5/9/2025    10:38 AM 4/24/2024     2:43 PM   PHQ Scores   PHQ2 Score 0 0   PHQ9 Score 0 0       Interpretation of Total Score Depression Severity: 1-4 = Minimal depression, 5-9 = Mild depression, 10-14 = Moderate depression, 15-19 = Moderately severe depression, 20-27 = Severe depression     Specialists:  None      Chronic

## 2025-05-09 NOTE — ASSESSMENT & PLAN NOTE
Chronic, at goal (stable), continue current treatment plan. May switch amlodipine for a ACE/ARB at future visit.

## 2025-05-09 NOTE — PROGRESS NOTES
The patient, Kimmie Bailey, identity was verified by name and MRN.  Chief Complaint   Patient presents with    Annual Exam     Top of feet hurt possible pair of shoes maybe specialists.     No LMP recorded. Patient is postmenopausal.  Resp 16   Ht 1.6 m (5' 3\")   Wt 101.2 kg (223 lb)   BMI 39.50 kg/m²        No data to display              No data recorded  \"Have you been to the ER, urgent care clinic since your last visit?  Hospitalized since your last visit?\"    YES - When: approximately 4 days ago.  [facility] Bronchitis    “Have you seen or consulted any other health care providers outside our system since your last visit?”    NO      “Have you had a colorectal cancer screening such as a colonoscopy/FIT/Cologuard?    NO    No colonoscopy on file  Date of last Cologuard: 8/31/2020  No FIT/FOBT on file   No flexible sigmoidoscopy on file     “Have you had a diabetic eye exam?”    NO     No diabetic eye exam on file            Social History     Substance and Sexual Activity   Sexual Activity Yes     Medication list reviewed and active medications noted. Patient is taking medications as directed.  See documentation in medication activity.  Allergies: allergy list reviewed, no new allergies added        Fabi Baker LPN

## 2025-05-09 NOTE — ASSESSMENT & PLAN NOTE
-Repeat POC HGBA1C is much improved at 6.3. Patient will continue to work on lifestyle modifications, discussed risks and benefits of Metformin.   -Discussed daily water intake of at least eight 8 ounce cups daily  -Discussed exercising at least thirty minutes daily, even if broken up into five minute increments in the day  -Discussed stress reduction and getting adequate sleep at night  -Discussed diet high in lean proteins and vegetables  -Discussed limiting sugary processed foods and take out  -Offered referral for dietician, order placed for diabetes education  -Discussed how to divide the plate, 50% to be non-starchy vegetable, 25% lean non-fried proteins like chicken or fish, and 25% whole carbohydrates like brown rice.

## 2025-05-09 NOTE — ASSESSMENT & PLAN NOTE
Chronic, not at goal (unstable), continue current plan pending work up below. Will likely need to increase statin intensity based on ASCVD risk score.

## 2025-05-10 LAB
25(OH)D3+25(OH)D2 SERPL-MCNC: 16.3 NG/ML (ref 30–100)
ALBUMIN SERPL-MCNC: 4.2 G/DL (ref 3.9–4.9)
ALP SERPL-CCNC: 68 IU/L (ref 44–121)
ALT SERPL-CCNC: 18 IU/L (ref 0–32)
AST SERPL-CCNC: 19 IU/L (ref 0–40)
BASOPHILS # BLD AUTO: 0 X10E3/UL (ref 0–0.2)
BASOPHILS NFR BLD AUTO: 1 %
BILIRUB SERPL-MCNC: 0.4 MG/DL (ref 0–1.2)
BUN SERPL-MCNC: 14 MG/DL (ref 8–27)
BUN/CREAT SERPL: 19 (ref 12–28)
CALCIUM SERPL-MCNC: 9.9 MG/DL (ref 8.7–10.3)
CHLORIDE SERPL-SCNC: 100 MMOL/L (ref 96–106)
CHOLEST SERPL-MCNC: 123 MG/DL (ref 100–199)
CO2 SERPL-SCNC: 28 MMOL/L (ref 20–29)
CREAT SERPL-MCNC: 0.73 MG/DL (ref 0.57–1)
EGFRCR SERPLBLD CKD-EPI 2021: 92 ML/MIN/1.73
EOSINOPHIL # BLD AUTO: 0.3 X10E3/UL (ref 0–0.4)
EOSINOPHIL NFR BLD AUTO: 5 %
ERYTHROCYTE [DISTWIDTH] IN BLOOD BY AUTOMATED COUNT: 12.7 % (ref 11.7–15.4)
GLOBULIN SER CALC-MCNC: 2.2 G/DL (ref 1.5–4.5)
GLUCOSE SERPL-MCNC: 99 MG/DL (ref 70–99)
HCT VFR BLD AUTO: 43.2 % (ref 34–46.6)
HDLC SERPL-MCNC: 36 MG/DL
HGB BLD-MCNC: 14.4 G/DL (ref 11.1–15.9)
IMM GRANULOCYTES # BLD AUTO: 0 X10E3/UL (ref 0–0.1)
IMM GRANULOCYTES NFR BLD AUTO: 0 %
LDLC SERPL CALC-MCNC: 69 MG/DL (ref 0–99)
LYMPHOCYTES # BLD AUTO: 3.5 X10E3/UL (ref 0.7–3.1)
LYMPHOCYTES NFR BLD AUTO: 55 %
MCH RBC QN AUTO: 29.7 PG (ref 26.6–33)
MCHC RBC AUTO-ENTMCNC: 33.3 G/DL (ref 31.5–35.7)
MCV RBC AUTO: 89 FL (ref 79–97)
MONOCYTES # BLD AUTO: 0.5 X10E3/UL (ref 0.1–0.9)
MONOCYTES NFR BLD AUTO: 8 %
NEUTROPHILS # BLD AUTO: 1.9 X10E3/UL (ref 1.4–7)
NEUTROPHILS NFR BLD AUTO: 31 %
PLATELET # BLD AUTO: 216 X10E3/UL (ref 150–450)
POTASSIUM SERPL-SCNC: 3.2 MMOL/L (ref 3.5–5.2)
PROT SERPL-MCNC: 6.4 G/DL (ref 6–8.5)
RBC # BLD AUTO: 4.85 X10E6/UL (ref 3.77–5.28)
SODIUM SERPL-SCNC: 140 MMOL/L (ref 134–144)
TRIGL SERPL-MCNC: 91 MG/DL (ref 0–149)
TSH SERPL DL<=0.005 MIU/L-ACNC: 1.62 UIU/ML (ref 0.45–4.5)
VLDLC SERPL CALC-MCNC: 18 MG/DL (ref 5–40)
WBC # BLD AUTO: 6.2 X10E3/UL (ref 3.4–10.8)

## 2025-05-12 ENCOUNTER — RESULTS FOLLOW-UP (OUTPATIENT)
Facility: CLINIC | Age: 65
End: 2025-05-12

## 2025-05-12 DIAGNOSIS — E87.6 HYPOKALEMIA: Primary | ICD-10-CM

## 2025-05-12 DIAGNOSIS — E55.9 VITAMIN D DEFICIENCY: Primary | ICD-10-CM

## 2025-05-12 DIAGNOSIS — E87.6 HYPOKALEMIA: ICD-10-CM

## 2025-05-12 RX ORDER — POTASSIUM CHLORIDE 1500 MG/1
20 TABLET, EXTENDED RELEASE ORAL DAILY
Qty: 30 TABLET | Refills: 0 | Status: SHIPPED | OUTPATIENT
Start: 2025-05-12

## 2025-05-16 LAB
ALBUMIN/CREAT UR: 4 MG/G CREAT (ref 0–29)
CREAT UR-MCNC: 95.4 MG/DL
MICROALBUMIN UR-MCNC: 3.8 UG/ML

## 2025-05-27 ENCOUNTER — HOSPITAL ENCOUNTER (OUTPATIENT)
Facility: HOSPITAL | Age: 65
Discharge: HOME | End: 2025-05-30
Payer: COMMERCIAL

## 2025-05-27 DIAGNOSIS — Z12.31 ENCOUNTER FOR SCREENING MAMMOGRAM FOR MALIGNANT NEOPLASM OF BREAST: ICD-10-CM

## 2025-05-27 PROCEDURE — 77063 BREAST TOMOSYNTHESIS BI: CPT

## 2025-06-11 DIAGNOSIS — E87.6 HYPOKALEMIA: ICD-10-CM

## 2025-06-11 RX ORDER — POTASSIUM CHLORIDE 1500 MG/1
20 TABLET, EXTENDED RELEASE ORAL DAILY
Qty: 30 TABLET | Refills: 0 | Status: SHIPPED | OUTPATIENT
Start: 2025-06-11

## 2025-07-30 DIAGNOSIS — I10 PRIMARY HYPERTENSION: ICD-10-CM

## 2025-07-30 NOTE — TELEPHONE ENCOUNTER
Received a refill request by fax from [facility] at [address] [place] [address] for Rosuvastatin 5 MG tablets

## 2025-07-31 DIAGNOSIS — E78.00 HYPERCHOLESTEROLEMIA: ICD-10-CM

## 2025-07-31 DIAGNOSIS — I10 PRIMARY HYPERTENSION: ICD-10-CM

## 2025-07-31 RX ORDER — ATENOLOL AND CHLORTHALIDONE TABLET 100; 25 MG/1; MG/1
1 TABLET ORAL DAILY
Qty: 90 TABLET | Refills: 0 | OUTPATIENT
Start: 2025-07-31

## 2025-07-31 RX ORDER — ROSUVASTATIN CALCIUM 5 MG/1
5 TABLET, COATED ORAL NIGHTLY
Qty: 90 TABLET | Refills: 1 | Status: SHIPPED | OUTPATIENT
Start: 2025-07-31

## 2025-07-31 RX ORDER — ATENOLOL AND CHLORTHALIDONE TABLET 100; 25 MG/1; MG/1
1 TABLET ORAL DAILY
Qty: 90 TABLET | Refills: 1 | Status: SHIPPED | OUTPATIENT
Start: 2025-07-31

## 2025-08-01 DIAGNOSIS — I10 PRIMARY HYPERTENSION: ICD-10-CM

## 2025-08-01 RX ORDER — AMLODIPINE BESYLATE 5 MG/1
5 TABLET ORAL DAILY
Qty: 90 TABLET | Refills: 1 | Status: SHIPPED | OUTPATIENT
Start: 2025-08-01

## 2025-08-01 NOTE — TELEPHONE ENCOUNTER
Received a refill request by fax from [facility] at [address] [place] [address] for Amlodipine Besylate 5 MG tablets

## 2025-08-12 DIAGNOSIS — E87.6 HYPOKALEMIA: ICD-10-CM

## 2025-08-19 LAB
BUN SERPL-MCNC: 10 MG/DL (ref 8–27)
BUN/CREAT SERPL: 13 (ref 12–28)
CALCIUM SERPL-MCNC: 10.1 MG/DL (ref 8.7–10.3)
CHLORIDE SERPL-SCNC: 103 MMOL/L (ref 96–106)
CO2 SERPL-SCNC: 28 MMOL/L (ref 20–29)
CREAT SERPL-MCNC: 0.77 MG/DL (ref 0.57–1)
EGFRCR SERPLBLD CKD-EPI 2021: 86 ML/MIN/1.73
GLUCOSE SERPL-MCNC: 85 MG/DL (ref 70–99)
POTASSIUM SERPL-SCNC: 3.8 MMOL/L (ref 3.5–5.2)
SODIUM SERPL-SCNC: 143 MMOL/L (ref 134–144)